# Patient Record
Sex: FEMALE | Race: WHITE | NOT HISPANIC OR LATINO | Employment: OTHER | URBAN - METROPOLITAN AREA
[De-identification: names, ages, dates, MRNs, and addresses within clinical notes are randomized per-mention and may not be internally consistent; named-entity substitution may affect disease eponyms.]

---

## 2023-01-25 PROBLEM — L03.019 PARONYCHIA, FINGER: Status: ACTIVE | Noted: 2023-01-25

## 2023-01-25 PROBLEM — E11.9 TYPE 2 DIABETES MELLITUS (MULTI): Status: ACTIVE | Noted: 2023-01-25

## 2023-01-25 PROBLEM — H25.042 POSTERIOR SUBCAPSULAR POLAR AGE-RELATED CATARACT OF LEFT EYE: Status: ACTIVE | Noted: 2023-01-25

## 2023-01-25 PROBLEM — R19.5 LOOSE STOOLS: Status: ACTIVE | Noted: 2023-01-25

## 2023-01-25 PROBLEM — E66.811 CLASS 1 OBESITY WITH SERIOUS COMORBIDITY AND BODY MASS INDEX (BMI) OF 30.0 TO 30.9 IN ADULT: Status: ACTIVE | Noted: 2023-01-25

## 2023-01-25 PROBLEM — G89.29 CHRONIC RIGHT-SIDED LOW BACK PAIN WITH RIGHT-SIDED SCIATICA: Status: ACTIVE | Noted: 2023-01-25

## 2023-01-25 PROBLEM — N32.81 OAB (OVERACTIVE BLADDER): Status: ACTIVE | Noted: 2023-01-25

## 2023-01-25 PROBLEM — L25.9 CONTACT DERMATITIS: Status: ACTIVE | Noted: 2023-01-25

## 2023-01-25 PROBLEM — S69.90XA INJURY OF RING FINGER: Status: ACTIVE | Noted: 2023-01-25

## 2023-01-25 PROBLEM — H04.201 EPIPHORA OF RIGHT SIDE: Status: ACTIVE | Noted: 2023-01-25

## 2023-01-25 PROBLEM — R07.9 CHEST PAIN: Status: ACTIVE | Noted: 2023-01-25

## 2023-01-25 PROBLEM — M19.042 ARTHRITIS OF HAND, LEFT, DEGENERATIVE: Status: ACTIVE | Noted: 2023-01-25

## 2023-01-25 PROBLEM — M65.9 FLEXOR TENOSYNOVITIS OF FINGER: Status: ACTIVE | Noted: 2023-01-25

## 2023-01-25 PROBLEM — Z95.1 S/P CABG X 4: Status: ACTIVE | Noted: 2023-01-25

## 2023-01-25 PROBLEM — R53.83 FATIGUE: Status: ACTIVE | Noted: 2023-01-25

## 2023-01-25 PROBLEM — G89.29 CHRONIC LOW BACK PAIN: Status: ACTIVE | Noted: 2023-01-25

## 2023-01-25 PROBLEM — H04.203 TEARING EYES: Status: ACTIVE | Noted: 2023-01-25

## 2023-01-25 PROBLEM — N13.5 URETERAL STRICTURE, RIGHT: Status: ACTIVE | Noted: 2023-01-25

## 2023-01-25 PROBLEM — L30.4 INTERTRIGO: Status: ACTIVE | Noted: 2023-01-25

## 2023-01-25 PROBLEM — M65.30 ACQUIRED TRIGGER FINGER: Status: ACTIVE | Noted: 2023-01-25

## 2023-01-25 PROBLEM — H60.62 CHRONIC NON-INFECTIVE OTITIS EXTERNA OF LEFT EAR: Status: ACTIVE | Noted: 2023-01-25

## 2023-01-25 PROBLEM — H25.012 CORTICAL AGE-RELATED CATARACT OF LEFT EYE: Status: ACTIVE | Noted: 2023-01-25

## 2023-01-25 PROBLEM — N39.0 ACUTE URINARY TRACT INFECTION: Status: ACTIVE | Noted: 2023-01-25

## 2023-01-25 PROBLEM — N13.30 HYDRONEPHROSIS OF RIGHT KIDNEY: Status: ACTIVE | Noted: 2023-01-25

## 2023-01-25 PROBLEM — E55.9 VITAMIN D DEFICIENCY: Status: ACTIVE | Noted: 2023-01-25

## 2023-01-25 PROBLEM — K64.9 HEMORRHOIDS: Status: ACTIVE | Noted: 2023-01-25

## 2023-01-25 PROBLEM — M65.949 FLEXOR TENOSYNOVITIS OF FINGER: Status: ACTIVE | Noted: 2023-01-25

## 2023-01-25 PROBLEM — K21.9 CHRONIC GERD: Status: ACTIVE | Noted: 2023-01-25

## 2023-01-25 PROBLEM — J31.0 INFLAMED NASAL MUCOSA: Status: ACTIVE | Noted: 2023-01-25

## 2023-01-25 PROBLEM — L91.8 CUTANEOUS SKIN TAGS: Status: ACTIVE | Noted: 2023-01-25

## 2023-01-25 PROBLEM — M85.80 OSTEOPENIA: Status: ACTIVE | Noted: 2023-01-25

## 2023-01-25 PROBLEM — N20.0 NEPHROLITHIASIS: Status: ACTIVE | Noted: 2023-01-25

## 2023-01-25 PROBLEM — R60.9 EDEMA: Status: ACTIVE | Noted: 2023-01-25

## 2023-01-25 PROBLEM — F32.0 CURRENT MILD EPISODE OF MAJOR DEPRESSIVE DISORDER WITHOUT PRIOR EPISODE (CMS-HCC): Status: ACTIVE | Noted: 2023-01-25

## 2023-01-25 PROBLEM — R19.5 DARK STOOLS: Status: ACTIVE | Noted: 2023-01-25

## 2023-01-25 PROBLEM — R31.9 HEMATURIA: Status: ACTIVE | Noted: 2023-01-25

## 2023-01-25 PROBLEM — L65.9 HAIR LOSS: Status: ACTIVE | Noted: 2023-01-25

## 2023-01-25 PROBLEM — H25.11 AGE-RELATED NUCLEAR CATARACT OF RIGHT EYE: Status: ACTIVE | Noted: 2023-01-25

## 2023-01-25 PROBLEM — F32.A CHRONIC DEPRESSION: Status: ACTIVE | Noted: 2023-01-25

## 2023-01-25 PROBLEM — N20.1 RIGHT URETERAL STONE: Status: ACTIVE | Noted: 2023-01-25

## 2023-01-25 PROBLEM — R60.0 HAND EDEMA: Status: ACTIVE | Noted: 2023-01-25

## 2023-01-25 PROBLEM — R80.9 DIABETES MELLITUS DUE TO UNDERLYING CONDITION WITH MICROALBUMINURIA, WITHOUT LONG-TERM CURRENT USE OF INSULIN (MULTI): Status: ACTIVE | Noted: 2023-01-25

## 2023-01-25 PROBLEM — J10.1 INFLUENZA A: Status: ACTIVE | Noted: 2023-01-25

## 2023-01-25 PROBLEM — L20.9 AD (ATOPIC DERMATITIS): Status: ACTIVE | Noted: 2023-01-25

## 2023-01-25 PROBLEM — N89.8 VAGINAL ITCHING: Status: ACTIVE | Noted: 2023-01-25

## 2023-01-25 PROBLEM — N28.9 RENAL INSUFFICIENCY: Status: ACTIVE | Noted: 2023-01-25

## 2023-01-25 PROBLEM — E78.5 HYPERLIPIDEMIA: Status: ACTIVE | Noted: 2023-01-25

## 2023-01-25 PROBLEM — H26.102 TRAUMATIC CATARACT OF LEFT EYE: Status: ACTIVE | Noted: 2023-01-25

## 2023-01-25 PROBLEM — E53.8 VITAMIN B12 DEFICIENCY: Status: ACTIVE | Noted: 2023-01-25

## 2023-01-25 PROBLEM — E66.9 CLASS 1 OBESITY WITH SERIOUS COMORBIDITY AND BODY MASS INDEX (BMI) OF 30.0 TO 30.9 IN ADULT: Status: ACTIVE | Noted: 2023-01-25

## 2023-01-25 PROBLEM — M54.41 CHRONIC RIGHT-SIDED LOW BACK PAIN WITH RIGHT-SIDED SCIATICA: Status: ACTIVE | Noted: 2023-01-25

## 2023-01-25 PROBLEM — M79.609 LIMB PAIN: Status: ACTIVE | Noted: 2023-01-25

## 2023-01-25 PROBLEM — R39.15 URINARY URGENCY: Status: ACTIVE | Noted: 2023-01-25

## 2023-01-25 PROBLEM — L29.9 ITCHING OF EAR: Status: ACTIVE | Noted: 2023-01-25

## 2023-01-25 PROBLEM — I25.10 CAD (CORONARY ARTERY DISEASE): Status: ACTIVE | Noted: 2023-01-25

## 2023-01-25 PROBLEM — M54.50 CHRONIC LOW BACK PAIN: Status: ACTIVE | Noted: 2023-01-25

## 2023-01-25 PROBLEM — H25.011 CORTICAL AGE-RELATED CATARACT OF RIGHT EYE: Status: ACTIVE | Noted: 2023-01-25

## 2023-01-25 PROBLEM — M79.641 PAIN OF RIGHT HAND: Status: ACTIVE | Noted: 2023-01-25

## 2023-01-25 PROBLEM — N18.30 CKD (CHRONIC KIDNEY DISEASE) STAGE 3, GFR 30-59 ML/MIN (MULTI): Status: ACTIVE | Noted: 2023-01-25

## 2023-01-25 PROBLEM — R32 URINARY INCONTINENCE: Status: ACTIVE | Noted: 2023-01-25

## 2023-01-25 PROBLEM — M19.041 ARTHRITIS OF HAND, RIGHT, DEGENERATIVE: Status: ACTIVE | Noted: 2023-01-25

## 2023-01-25 PROBLEM — F43.21 GRIEF: Status: ACTIVE | Noted: 2023-01-25
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
## 2023-01-25 PROBLEM — E08.29 DIABETES MELLITUS DUE TO UNDERLYING CONDITION WITH MICROALBUMINURIA, WITHOUT LONG-TERM CURRENT USE OF INSULIN (MULTI): Status: ACTIVE | Noted: 2023-01-25

## 2023-01-25 PROBLEM — J30.9 ALLERGIC RHINITIS: Status: ACTIVE | Noted: 2023-01-25

## 2023-01-25 PROBLEM — H25.12 AGE-RELATED NUCLEAR CATARACT OF LEFT EYE: Status: ACTIVE | Noted: 2023-01-25

## 2023-01-25 PROBLEM — N18.9 CHRONIC RENAL INSUFFICIENCY: Status: ACTIVE | Noted: 2023-01-25

## 2023-01-25 RX ORDER — SIMVASTATIN 20 MG/1
1 TABLET, FILM COATED ORAL NIGHTLY
COMMUNITY
Start: 2016-12-20 | End: 2024-02-07 | Stop reason: SDUPTHER

## 2023-01-25 RX ORDER — LANCETS 28 GAUGE
EACH MISCELLANEOUS
COMMUNITY

## 2023-01-25 RX ORDER — CHOLECALCIFEROL (VITAMIN D3) 25 MCG
1 TABLET ORAL DAILY
COMMUNITY
Start: 2016-11-15

## 2023-01-25 RX ORDER — METOPROLOL TARTRATE 50 MG/1
1 TABLET ORAL 2 TIMES DAILY
COMMUNITY
Start: 2016-11-15 | End: 2024-02-07 | Stop reason: SDUPTHER

## 2023-01-25 RX ORDER — GLIMEPIRIDE 2 MG/1
2 TABLET ORAL 2 TIMES DAILY
COMMUNITY

## 2023-01-25 RX ORDER — ELECTROLYTES/DEXTROSE
1 SOLUTION, ORAL ORAL DAILY
COMMUNITY
Start: 2017-08-11

## 2023-01-25 RX ORDER — BLOOD SUGAR DIAGNOSTIC
STRIP MISCELLANEOUS
COMMUNITY
Start: 2021-01-11

## 2023-01-25 RX ORDER — METFORMIN HYDROCHLORIDE 500 MG/1
500 TABLET ORAL
COMMUNITY
Start: 2014-02-10

## 2023-01-25 RX ORDER — ASPIRIN 81 MG/1
1 TABLET ORAL DAILY
COMMUNITY
Start: 2021-12-15

## 2023-03-08 ENCOUNTER — APPOINTMENT (OUTPATIENT)
Dept: PRIMARY CARE | Facility: CLINIC | Age: 88
End: 2023-03-08
Payer: MEDICARE

## 2023-03-13 LAB
ALBUMIN (MG/L) IN URINE: 32.1 MG/L
ALBUMIN/CREATININE (UG/MG) IN URINE: 32.9 UG/MG CRT (ref 0–30)
ANION GAP IN SER/PLAS: 14 MMOL/L (ref 10–20)
CALCIUM (MG/DL) IN SER/PLAS: 9.8 MG/DL (ref 8.6–10.3)
CARBON DIOXIDE, TOTAL (MMOL/L) IN SER/PLAS: 26 MMOL/L (ref 21–32)
CHLORIDE (MMOL/L) IN SER/PLAS: 103 MMOL/L (ref 98–107)
CREATININE (MG/DL) IN SER/PLAS: 1.27 MG/DL (ref 0.5–1.05)
CREATININE (MG/DL) IN URINE: 97.5 MG/DL (ref 20–320)
ESTIMATED AVERAGE GLUCOSE FOR HBA1C: 200 MG/DL
GFR FEMALE: 41 ML/MIN/1.73M2
GLUCOSE (MG/DL) IN SER/PLAS: 168 MG/DL (ref 74–99)
HEMOGLOBIN A1C/HEMOGLOBIN TOTAL IN BLOOD: 8.6 %
POTASSIUM (MMOL/L) IN SER/PLAS: 4.3 MMOL/L (ref 3.5–5.3)
SODIUM (MMOL/L) IN SER/PLAS: 139 MMOL/L (ref 136–145)
UREA NITROGEN (MG/DL) IN SER/PLAS: 17 MG/DL (ref 6–23)

## 2023-03-28 ENCOUNTER — OFFICE VISIT (OUTPATIENT)
Dept: PRIMARY CARE | Facility: CLINIC | Age: 88
End: 2023-03-28
Payer: MEDICARE

## 2023-03-28 VITALS
TEMPERATURE: 96.8 F | HEIGHT: 61 IN | OXYGEN SATURATION: 98 % | HEART RATE: 76 BPM | DIASTOLIC BLOOD PRESSURE: 80 MMHG | SYSTOLIC BLOOD PRESSURE: 138 MMHG | BODY MASS INDEX: 29.11 KG/M2 | WEIGHT: 154.2 LBS

## 2023-03-28 DIAGNOSIS — R80.9 DIABETES MELLITUS DUE TO UNDERLYING CONDITION WITH MICROALBUMINURIA, WITHOUT LONG-TERM CURRENT USE OF INSULIN (MULTI): ICD-10-CM

## 2023-03-28 DIAGNOSIS — I25.10 CORONARY ARTERY DISEASE INVOLVING NATIVE CORONARY ARTERY OF NATIVE HEART WITHOUT ANGINA PECTORIS: ICD-10-CM

## 2023-03-28 DIAGNOSIS — Z00.00 MEDICARE ANNUAL WELLNESS VISIT, SUBSEQUENT: Primary | ICD-10-CM

## 2023-03-28 DIAGNOSIS — E08.29 DIABETES MELLITUS DUE TO UNDERLYING CONDITION WITH MICROALBUMINURIA, WITHOUT LONG-TERM CURRENT USE OF INSULIN (MULTI): ICD-10-CM

## 2023-03-28 DIAGNOSIS — E78.2 MIXED HYPERLIPIDEMIA: ICD-10-CM

## 2023-03-28 PROCEDURE — 1170F FXNL STATUS ASSESSED: CPT | Performed by: INTERNAL MEDICINE

## 2023-03-28 PROCEDURE — 99213 OFFICE O/P EST LOW 20 MIN: CPT | Performed by: INTERNAL MEDICINE

## 2023-03-28 PROCEDURE — 1159F MED LIST DOCD IN RCRD: CPT | Performed by: INTERNAL MEDICINE

## 2023-03-28 PROCEDURE — G0439 PPPS, SUBSEQ VISIT: HCPCS | Performed by: INTERNAL MEDICINE

## 2023-03-28 ASSESSMENT — ACTIVITIES OF DAILY LIVING (ADL)
MANAGING_FINANCES: INDEPENDENT
TAKING_MEDICATION: INDEPENDENT
GROCERY_SHOPPING: INDEPENDENT
DOING_HOUSEWORK: INDEPENDENT
BATHING: INDEPENDENT
DRESSING: INDEPENDENT

## 2023-03-28 ASSESSMENT — ENCOUNTER SYMPTOMS
MUSCULOSKELETAL NEGATIVE: 1
ENDOCRINE NEGATIVE: 1
ALLERGIC/IMMUNOLOGIC NEGATIVE: 1
HEMATOLOGIC/LYMPHATIC NEGATIVE: 1
CARDIOVASCULAR NEGATIVE: 1
GASTROINTESTINAL NEGATIVE: 1
EYES NEGATIVE: 1
RESPIRATORY NEGATIVE: 1
CONSTITUTIONAL NEGATIVE: 1
PSYCHIATRIC NEGATIVE: 1
DEPRESSION: 0
OCCASIONAL FEELINGS OF UNSTEADINESS: 0
LOSS OF SENSATION IN FEET: 0
NEUROLOGICAL NEGATIVE: 1

## 2023-03-28 NOTE — PROGRESS NOTES
"Subjective   Patient ID: Lisa Thakkar is a 88 y.o. female who presents for 6 month follow up and Medicare Annual Wellness Visit Subsequent.  Lipids have been well controlled on current medication regimen.  Denies myalgias or other side effects of medication.  This is managed by Cardiology.    Patient presents in follow up re:  Type 2 diabetes.  Patient has been compliant with medical therapy as prescribed and mostly compliant with diet and exercise recommendations.  HgbA1c has been maintained at goal level.  No hypoglycemia reported and no other associated complaints.  This is managed by Endo.          Review of Systems   Constitutional: Negative.    HENT: Negative.     Eyes: Negative.    Respiratory: Negative.     Cardiovascular: Negative.    Gastrointestinal: Negative.    Endocrine: Negative.    Genitourinary: Negative.    Musculoskeletal: Negative.    Skin: Negative.    Allergic/Immunologic: Negative.    Neurological: Negative.    Hematological: Negative.    Psychiatric/Behavioral: Negative.         Objective     Blood pressure 160/62, pulse 76, temperature 36 °C (96.8 °F), temperature source Temporal, height 1.549 m (5' 1\"), weight 69.9 kg (154 lb 3.2 oz), SpO2 98 %.   Physical Exam  Constitutional:       Appearance: Normal appearance.   Neck:      Vascular: No carotid bruit.   Cardiovascular:      Rate and Rhythm: Normal rate and regular rhythm.      Pulses: Normal pulses.      Heart sounds: Normal heart sounds. No murmur heard.  Pulmonary:      Effort: Pulmonary effort is normal.      Breath sounds: Normal breath sounds. No wheezing or rales.   Abdominal:      General: Abdomen is flat. There is no distension.      Palpations: Abdomen is soft.      Tenderness: There is no abdominal tenderness.   Musculoskeletal:         General: Normal range of motion.      Cervical back: Normal range of motion and neck supple.   Skin:     General: Skin is warm and dry.   Neurological:      General: No focal deficit present.    "   Mental Status: She is alert and oriented to person, place, and time.   Psychiatric:         Mood and Affect: Mood normal.         Behavior: Behavior normal.         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  Patient does not have advanced care planning in place.  This is discussed and form is given today  Sugars well controlled overall with A1c at goal.  Will continue present medical therapy as per Endo.  LDL at goal on medication per Cardiology.  No adverse effects from medication.  Advised to continue present therapy.  BP well controlled on current therapy per Cardiology.  Advised to continue present therapy.    Since all current medical care is per the subspecialists, Patient is asked to follow up annually or prn

## 2023-04-10 ENCOUNTER — OFFICE VISIT (OUTPATIENT)
Dept: PRIMARY CARE | Facility: CLINIC | Age: 88
End: 2023-04-10
Payer: MEDICARE

## 2023-04-10 ENCOUNTER — APPOINTMENT (OUTPATIENT)
Dept: PRIMARY CARE | Facility: CLINIC | Age: 88
End: 2023-04-10
Payer: MEDICARE

## 2023-04-10 VITALS
SYSTOLIC BLOOD PRESSURE: 140 MMHG | DIASTOLIC BLOOD PRESSURE: 70 MMHG | OXYGEN SATURATION: 93 % | TEMPERATURE: 97 F | HEART RATE: 81 BPM | WEIGHT: 155.4 LBS | BODY MASS INDEX: 29.36 KG/M2
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
## 2023-04-10 DIAGNOSIS — S01.01XD LACERATION OF OCCIPITAL REGION OF SCALP, SUBSEQUENT ENCOUNTER: ICD-10-CM

## 2023-04-10 PROCEDURE — 99213 OFFICE O/P EST LOW 20 MIN: CPT | Performed by: INTERNAL MEDICINE

## 2023-04-10 PROCEDURE — 1159F MED LIST DOCD IN RCRD: CPT | Performed by: INTERNAL MEDICINE

## 2023-04-10 ASSESSMENT — ENCOUNTER SYMPTOMS
HEMATOLOGIC/LYMPHATIC NEGATIVE: 1
ENDOCRINE NEGATIVE: 1
NEUROLOGICAL NEGATIVE: 1
EYES NEGATIVE: 1
CONSTITUTIONAL NEGATIVE: 1
PSYCHIATRIC NEGATIVE: 1
RESPIRATORY NEGATIVE: 1
ALLERGIC/IMMUNOLOGIC NEGATIVE: 1
CARDIOVASCULAR NEGATIVE: 1
GASTROINTESTINAL NEGATIVE: 1
MUSCULOSKELETAL NEGATIVE: 1

## 2023-04-10 NOTE — PROGRESS NOTES
Subjective   Patient ID: Lisa Thakkar is a 88 y.o. female who presents for follow up after fall .  Patient presents today with a complaint of a recent fall with laceration requiring staple placement for closure on the occipital region.    The specifics of the fall are that she was in her garage and took 2 steps backwards causing her to trip on a step and fall backwards.  No dizziness or LOC.  ER record is reviewed in detail confirming the above as well as all testing documenting no other serious injuries or intracranial abnormalities.        Review of Systems   Constitutional: Negative.    HENT: Negative.     Eyes: Negative.    Respiratory: Negative.     Cardiovascular: Negative.    Gastrointestinal: Negative.    Endocrine: Negative.    Genitourinary: Negative.    Musculoskeletal: Negative.    Skin: Negative.    Allergic/Immunologic: Negative.    Neurological: Negative.    Hematological: Negative.    Psychiatric/Behavioral: Negative.         Objective     Blood pressure 140/70, pulse 81, temperature 36.1 °C (97 °F), temperature source Temporal, weight 70.5 kg (155 lb 6.4 oz), SpO2 93 %.   Physical Exam  Constitutional:       Appearance: Normal appearance.   Cardiovascular:      Rate and Rhythm: Normal rate and regular rhythm.   Pulmonary:      Effort: Pulmonary effort is normal.      Breath sounds: Normal breath sounds.   Musculoskeletal:      Cervical back: Normal range of motion and neck supple.   Skin:     Comments: Well healed laceration on the occiput with 3 staples visualized for closure.   Neurological:      General: No focal deficit present.      Mental Status: She is alert and oriented to person, place, and time.   Psychiatric:         Mood and Affect: Mood normal.         Behavior: Behavior normal.         Assessment/Plan   Problem List Items Addressed This Visit    None  Visit Diagnoses       Fall in home, subsequent encounter    -  Primary    Laceration of occipital region of scalp, subsequent encounter               Will remove sutures today.  No other issues identified.  She receives her other medical care through Endo and Cardiology.    Will plan routine medical follow up as needed in 6 months.

## 2023-04-27 ENCOUNTER — PATIENT OUTREACH (OUTPATIENT)
Dept: PRIMARY CARE | Facility: CLINIC | Age: 88
End: 2023-04-27
Payer: MEDICARE

## 2023-04-27 ENCOUNTER — DOCUMENTATION (OUTPATIENT)
Dept: PRIMARY CARE | Facility: CLINIC | Age: 88
End: 2023-04-27
Payer: MEDICARE

## 2023-04-27 RX ORDER — CEPHALEXIN 500 MG/1
500 CAPSULE ORAL 2 TIMES DAILY
COMMUNITY
Start: 2023-04-26 | End: 2024-02-07 | Stop reason: ALTCHOICE

## 2023-04-27 NOTE — PROGRESS NOTES
Discharge Facility: [facility]  Discharge Diagnosis: Ureteral obstruction  Admission Date: 4/24/23  Discharge Date: 4/26/23    PCP Appointment Date: Declined to schedule  Specialist Appointment Date: Urology 5/9/23  Hospital Encounter and Summary: Linked   See discharge assessment below for further details   Patient states she has taken Cephalexin in the past for UTI with no adverse effects. Patient has already started the medication and reports no adverse reaction at this time.  Engagement  Call Start Time: 1400 (4/27/2023  2:04 PM)    Medications  Medications reviewed with patient/caregiver?: Yes (4/27/2023  2:04 PM)  Is the patient having any side effects they believe may be caused by any medication additions or changes?: No (4/27/2023  2:04 PM)  Does the patient have all medications ordered at discharge?: Yes (4/27/2023  2:04 PM)  Care Management Interventions: No intervention needed (4/27/2023  2:04 PM)  Is the patient taking all medications as directed (includes completed medication regime)?: Yes (4/27/2023  2:04 PM)  Care Management Interventions: Provided patient education (4/27/2023  2:04 PM)    Appointments  Does the patient have a primary care provider?: Yes (4/27/2023  2:04 PM)  Care Management Interventions: Advised patient to make appointment (4/27/2023  2:04 PM)  Has the patient kept scheduled appointments due by today?: Not applicable (4/27/2023  2:04 PM)    Self Management  What is the home health agency?: [facility] (4/27/2023  2:04 PM)  Has home health visited the patient within 72 hours of discharge?: Call prior to 72 hours (4/27/2023  2:04 PM)  What Durable Medical Equipment (DME) was ordered?: n/a (4/27/2023  2:04 PM)    Patient Teaching  Does the patient have access to their discharge instructions?: Yes (4/27/2023  2:04 PM)  Care Management Interventions: Reviewed instructions with patient (4/27/2023  2:04 PM)  What is the patient's perception of their health status since discharge?: Same (4/27/2023  2:04  PM)  Is the patient/caregiver able to teach back the hierarchy of who to call/visit for symptoms/problems? PCP, Specialist, Home Health nurse, Urgent Care, ED, 911: Yes (4/27/2023  2:04 PM)    Wrap Up  Wrap Up Additional Comments: Patient states she is home and not recovering as she would like. Patient reports continued low flank pain that has not worsened, but has not improved since she was admitted. Patient has a urology appt scheduled for 5/9. Patients states home care already called her to start care and a nurse is scheduled to come out tomorrow. Patient denied any new symptoms. Patient has all medications needed in the home- reports taking the antibiotic as prescribed. Educated on possible GI side effects such as diarrhea and to report side effects to her PCP promptly. Patient denied need for DME stating she already has everything she needs. Patient states she is waiting to schedule a PCP appointment until home care comes out to see her. Patient denied any further questions or concerns. Patient was encouraged to call back with any questions or concerns with phone number provided. Encouraged patient to continue checking her blood sugars and documenting. Educated on need to eat protein and drink plenty of water. Patient verbalized understanding of education. (4/27/2023  2:04 PM)  Call End Time: 1412 (4/27/2023  2:04 PM)

## 2023-05-01 ENCOUNTER — TELEPHONE (OUTPATIENT)
Dept: PRIMARY CARE | Facility: CLINIC | Age: 88
End: 2023-05-01
Payer: MEDICARE

## 2023-05-01 NOTE — TELEPHONE ENCOUNTER
[facility] the patient is experiencing significant back pain from a fall back in March. The patient stated that she is having trouble sleeping in different positions because of the pain.     The patient is reporting that her pain level is a 9/10.     The patient did go to the ER on 04/24/2023 and Nani wanted to know did you get the x-ray reults from the ER yet?

## 2023-05-02 NOTE — TELEPHONE ENCOUNTER
Nani, an OT from  Home care called in to report that this patient's BP was elevated at 164/85 and the patient sis take all meds as prescribed.

## 2023-05-03 LAB
ANION GAP IN SER/PLAS: 16 MMOL/L (ref 10–20)
CALCIUM (MG/DL) IN SER/PLAS: 9.9 MG/DL (ref 8.6–10.6)
CARBON DIOXIDE, TOTAL (MMOL/L) IN SER/PLAS: 26 MMOL/L (ref 21–32)
CHLORIDE (MMOL/L) IN SER/PLAS: 101 MMOL/L (ref 98–107)
CREATININE (MG/DL) IN SER/PLAS: 1.21 MG/DL (ref 0.5–1.05)
GFR FEMALE: 43 ML/MIN/1.73M2
GLUCOSE (MG/DL) IN SER/PLAS: 238 MG/DL (ref 74–99)
POTASSIUM (MMOL/L) IN SER/PLAS: 5.2 MMOL/L (ref 3.5–5.3)
SODIUM (MMOL/L) IN SER/PLAS: 138 MMOL/L (ref 136–145)
UREA NITROGEN (MG/DL) IN SER/PLAS: 19 MG/DL (ref 6–23)

## 2023-05-12 ENCOUNTER — PATIENT OUTREACH (OUTPATIENT)
Dept: PRIMARY CARE | Facility: CLINIC | Age: 88
End: 2023-05-12
Payer: MEDICARE
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
## 2023-05-12 NOTE — PROGRESS NOTES
Unable to reach patient for call back after patient's follow up appointment with Urology. Patient declined to follow up with her PCP and has no upcoming appts scheduled.  M with call back number for patient to call if needed   If no voicemail available call attempts x 2 were made to contact the patient to assist with any questions or concerns patient may have.

## 2023-05-26 ENCOUNTER — PATIENT OUTREACH (OUTPATIENT)
Dept: PRIMARY CARE | Facility: CLINIC | Age: 88
End: 2023-05-26
Payer: MEDICARE
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
## 2023-05-26 NOTE — PROGRESS NOTES
Call placed regarding one month post discharge follow up call.  At time of outreach call the patient feels as if their condition has returned to baseline since initial visit with PCP or specialist.  Questions or concerns regarding recovery period addressed at this time.   Reviewed any PCP or specialists progress notes/labs/radiology reports if applicable and addressed any questions or concerns. Patient followed up with Urology and declined to schedule a follow up with her PCP post hospitalization. Patient states she is doing much better and has no further needs or questions at this time. I encouraged her to call her PCP promptly with any changes in status to prevent another hospitalization. Patient verbalized her understanding.

## 2023-07-26 ENCOUNTER — PATIENT OUTREACH (OUTPATIENT)
Dept: PRIMARY CARE | Facility: CLINIC | Age: 88
End: 2023-07-26
Payer: MEDICARE
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
## 2023-07-26 NOTE — PROGRESS NOTES
Unsuccessful outreach to this patient for the 90 day post discharge outreach. Left a voice-mail message including my direct call back number for the patient to call regarding any questions or concerns.  Patient has met target of no readmissions for 90 days and has graduated from [facility]

## 2023-09-15 LAB
ALBUMIN (MG/L) IN URINE: 30.5 MG/L
ALBUMIN/CREATININE (UG/MG) IN URINE: 35.5 UG/MG CRT (ref 0–30)
ANION GAP IN SER/PLAS: 15 MMOL/L (ref 10–20)
CALCIUM (MG/DL) IN SER/PLAS: 9.9 MG/DL (ref 8.6–10.6)
CARBON DIOXIDE, TOTAL (MMOL/L) IN SER/PLAS: 27 MMOL/L (ref 21–32)
CHLORIDE (MMOL/L) IN SER/PLAS: 106 MMOL/L (ref 98–107)
CHOLESTEROL (MG/DL) IN SER/PLAS: 127 MG/DL (ref 0–199)
CHOLESTEROL IN HDL (MG/DL) IN SER/PLAS: 50.3 MG/DL
CHOLESTEROL/HDL RATIO: 2.5
CREATININE (MG/DL) IN SER/PLAS: 1.25 MG/DL (ref 0.5–1.05)
CREATININE (MG/DL) IN URINE: 86 MG/DL (ref 20–320)
ESTIMATED AVERAGE GLUCOSE FOR HBA1C: 212 MG/DL
GFR FEMALE: 42 ML/MIN/1.73M2
GLUCOSE (MG/DL) IN SER/PLAS: 168 MG/DL (ref 74–99)
HEMOGLOBIN A1C/HEMOGLOBIN TOTAL IN BLOOD: 9 %
LDL: 38 MG/DL (ref 0–99)
POTASSIUM (MMOL/L) IN SER/PLAS: 4.3 MMOL/L (ref 3.5–5.3)
SODIUM (MMOL/L) IN SER/PLAS: 144 MMOL/L (ref 136–145)
TRIGLYCERIDE (MG/DL) IN SER/PLAS: 195 MG/DL (ref 0–149)
UREA NITROGEN (MG/DL) IN SER/PLAS: 15 MG/DL (ref 6–23)
VLDL: 39 MG/DL (ref 0–40)

## 2023-10-05 ENCOUNTER — LAB (OUTPATIENT)
Dept: LAB | Facility: LAB | Age: 88
End: 2023-10-05
Payer: MEDICARE

## 2023-10-05 ENCOUNTER — OFFICE VISIT (OUTPATIENT)
Dept: PRIMARY CARE | Facility: CLINIC | Age: 88
End: 2023-10-05
Payer: MEDICARE

## 2023-10-05 VITALS
WEIGHT: 159 LBS | DIASTOLIC BLOOD PRESSURE: 66 MMHG | TEMPERATURE: 97.2 F | HEIGHT: 61 IN | HEART RATE: 87 BPM | SYSTOLIC BLOOD PRESSURE: 139 MMHG | OXYGEN SATURATION: 95 % | BODY MASS INDEX: 30.02 KG/M2
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
## 2023-10-05 DIAGNOSIS — R53.83 OTHER FATIGUE: ICD-10-CM
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
## 2023-10-05 DIAGNOSIS — H61.22 IMPACTED CERUMEN OF LEFT EAR: ICD-10-CM

## 2023-10-05 DIAGNOSIS — Z23 NEED FOR IMMUNIZATION AGAINST INFLUENZA: ICD-10-CM

## 2023-10-05 DIAGNOSIS — R80.9 DIABETES MELLITUS DUE TO UNDERLYING CONDITION WITH MICROALBUMINURIA, WITHOUT LONG-TERM CURRENT USE OF INSULIN (MULTI): ICD-10-CM

## 2023-10-05 DIAGNOSIS — M54.50 ACUTE RIGHT-SIDED LOW BACK PAIN WITHOUT SCIATICA: ICD-10-CM

## 2023-10-05 DIAGNOSIS — H91.92 DECREASED HEARING OF LEFT EAR: ICD-10-CM

## 2023-10-05 DIAGNOSIS — E08.29 DIABETES MELLITUS DUE TO UNDERLYING CONDITION WITH MICROALBUMINURIA, WITHOUT LONG-TERM CURRENT USE OF INSULIN (MULTI): ICD-10-CM

## 2023-10-05 LAB
BASOPHILS # BLD AUTO: 0.03 X10*3/UL (ref 0–0.1)
BASOPHILS NFR BLD AUTO: 0.5 %
EOSINOPHIL # BLD AUTO: 0.11 X10*3/UL (ref 0–0.4)
EOSINOPHIL NFR BLD AUTO: 1.7 %
ERYTHROCYTE [DISTWIDTH] IN BLOOD BY AUTOMATED COUNT: 14.9 % (ref 11.5–14.5)
HCT VFR BLD AUTO: 38.2 % (ref 36–46)
HGB BLD-MCNC: 12.7 G/DL (ref 12–16)
IMM GRANULOCYTES # BLD AUTO: 0.03 X10*3/UL (ref 0–0.5)
IMM GRANULOCYTES NFR BLD AUTO: 0.5 % (ref 0–0.9)
LYMPHOCYTES # BLD AUTO: 1.28 X10*3/UL (ref 0.8–3)
LYMPHOCYTES NFR BLD AUTO: 19.7 %
MCH RBC QN AUTO: 28.7 PG (ref 26–34)
MCHC RBC AUTO-ENTMCNC: 33.2 G/DL (ref 32–36)
MCV RBC AUTO: 86 FL (ref 80–100)
MONOCYTES # BLD AUTO: 0.57 X10*3/UL (ref 0.05–0.8)
MONOCYTES NFR BLD AUTO: 8.8 %
NEUTROPHILS # BLD AUTO: 4.47 X10*3/UL (ref 1.6–5.5)
NEUTROPHILS NFR BLD AUTO: 68.8 %
NRBC BLD-RTO: 0 /100 WBCS (ref 0–0)
PLATELET # BLD AUTO: 285 X10*3/UL (ref 150–450)
PMV BLD AUTO: 11 FL (ref 7.5–11.5)
RBC # BLD AUTO: 4.42 X10*6/UL (ref 4–5.2)
VIT B12 SERPL-MCNC: 250 PG/ML (ref 211–911)
WBC # BLD AUTO: 6.5 X10*3/UL (ref 4.4–11.3)

## 2023-10-05 PROCEDURE — 36415 COLL VENOUS BLD VENIPUNCTURE: CPT

## 2023-10-05 PROCEDURE — G0008 ADMIN INFLUENZA VIRUS VAC: HCPCS | Performed by: INTERNAL MEDICINE

## 2023-10-05 PROCEDURE — 1159F MED LIST DOCD IN RCRD: CPT | Performed by: INTERNAL MEDICINE

## 2023-10-05 PROCEDURE — 1126F AMNT PAIN NOTED NONE PRSNT: CPT | Performed by: INTERNAL MEDICINE

## 2023-10-05 PROCEDURE — 1036F TOBACCO NON-USER: CPT | Performed by: INTERNAL MEDICINE

## 2023-10-05 PROCEDURE — 85025 COMPLETE CBC W/AUTO DIFF WBC: CPT

## 2023-10-05 PROCEDURE — 99214 OFFICE O/P EST MOD 30 MIN: CPT | Performed by: INTERNAL MEDICINE

## 2023-10-05 PROCEDURE — 82607 VITAMIN B-12: CPT

## 2023-10-05 PROCEDURE — 90662 IIV NO PRSV INCREASED AG IM: CPT | Performed by: INTERNAL MEDICINE

## 2023-10-05 ASSESSMENT — ENCOUNTER SYMPTOMS
CONSTITUTIONAL NEGATIVE: 1
OCCASIONAL FEELINGS OF UNSTEADINESS: 0
BACK PAIN: 1
DEPRESSION: 0
HEMATOLOGIC/LYMPHATIC NEGATIVE: 1
GASTROINTESTINAL NEGATIVE: 1
EYES NEGATIVE: 1
PSYCHIATRIC NEGATIVE: 1
ALLERGIC/IMMUNOLOGIC NEGATIVE: 1
RESPIRATORY NEGATIVE: 1
CARDIOVASCULAR NEGATIVE: 1
ENDOCRINE NEGATIVE: 1
LOSS OF SENSATION IN FEET: 0
NEUROLOGICAL NEGATIVE: 1

## 2023-10-05 NOTE — PROGRESS NOTES
"Subjective   Patient ID: Lisa Thakkar is a 88 y.o. female who presents for 6 month frollow up .  Patient presents today for brief check after having suffered a fall in the spring with closed head trauma.  She continues to otherwise follow with subspecialists for all of her chronic medical care and therapy.  She has had no sequelae of the fall and mental status has been at its usual baseline.    She has new complaint today of back pain.  She states that she got a new mattress and it is high from the floor and she thinks she sprained her back trying to get into the bed.  She states that she has thus been sleeping on her couch.  No radicular Sx.        Review of Systems   Constitutional: Negative.    HENT: Negative.     Eyes: Negative.    Respiratory: Negative.     Cardiovascular: Negative.    Gastrointestinal: Negative.    Endocrine: Negative.    Genitourinary: Negative.    Musculoskeletal:  Positive for back pain.   Skin: Negative.    Allergic/Immunologic: Negative.    Neurological: Negative.    Hematological: Negative.    Psychiatric/Behavioral: Negative.         Objective     Blood pressure 139/66, pulse 87, temperature 36.2 °C (97.2 °F), temperature source Temporal, height 1.549 m (5' 1\"), weight 72.1 kg (159 lb), SpO2 95 %.   Physical Exam  Vitals reviewed.   Constitutional:       Appearance: Normal appearance.   Neck:      Vascular: No carotid bruit.   Cardiovascular:      Rate and Rhythm: Normal rate and regular rhythm.      Pulses: Normal pulses.      Heart sounds: Normal heart sounds. No murmur heard.  Pulmonary:      Effort: Pulmonary effort is normal.      Breath sounds: Normal breath sounds. No wheezing or rales.   Abdominal:      General: Abdomen is flat. There is no distension.      Palpations: Abdomen is soft.      Tenderness: There is no abdominal tenderness.   Musculoskeletal:         General: Normal range of motion.      Cervical back: Normal range of motion and neck supple.   Skin:     General: Skin " "is warm and dry.   Neurological:      General: No focal deficit present.      Mental Status: She is alert and oriented to person, place, and time.   Psychiatric:         Mood and Affect: Mood normal.         Behavior: Behavior normal.         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, without long-term current use of insulin (CMS/[facility])    Fatigue    Relevant Orders    CBC and Auto Differential    Vitamin B12     Other Visit Diagnoses       Fall in home, subsequent encounter    -  Primary    Relevant Orders    Flu vaccine, quadrivalent, high-dose, preservative free, age 65y+ (FLUZONE)    Acute right-sided low back pain without sciatica        Need for immunization against influenza        Relevant Orders    Flu vaccine, quadrivalent, high-dose, preservative free, age 65y+ (FLUZONE)    Decreased hearing of left ear        Impacted cerumen of left ear              She is showing no sequelae of the fall several months ago.    She has a new lumbar strain on the right side related to her bed and recently sleeping on the couch.  She has been doing stretches and is encouraged on the use of local heat as well.  DM is managed by Endo and I note recent A1c elevated at 9.0%, but no changes were made to therapy at recent Endo OV.  I note slight elevation of BP at Endo visit, but BP is excellent today.  No indication for intervention.  She complains of always being tired.  She states she used to get B12 shots remotely.  She has not been anemic to suggest B12 deficiency.  Will check CBC and B12 level for her.  Patient also c/o decreased hearing in her left ear which \"is my good ear.\"  Exam confirms cerumen impaction.  She was offered irrigation but declined as she states she is to see an ear doctor in December.  I advised that this would likely not resolve on its own, but if she wished to have the issue for another 2 months, that is certainly " her prerogative.    > 30 minutes was spent with the patient today, the majority of which was spent on review of history and medications as well as counselling on care plan and/or coordination of care.     Follow up as scheduled in March.

## 2023-12-11 ENCOUNTER — TELEPHONE (OUTPATIENT)
Dept: PRIMARY CARE | Facility: CLINIC | Age: 88
End: 2023-12-11

## 2023-12-11 NOTE — TELEPHONE ENCOUNTER
Solomon calling for a referral to Dr Wang Roberts ENT.   Her appointment is scheduled for Dec 28th

## 2023-12-12 DIAGNOSIS — H91.92 DECREASED HEARING OF LEFT EAR: Primary | ICD-10-CM

## 2023-12-12 DIAGNOSIS — H61.22 IMPACTED CERUMEN OF LEFT EAR: ICD-10-CM

## 2023-12-28 ENCOUNTER — OFFICE VISIT (OUTPATIENT)
Dept: OTOLARYNGOLOGY | Facility: CLINIC | Age: 88
End: 2023-12-28
Payer: MEDICARE

## 2023-12-28 ENCOUNTER — CLINICAL SUPPORT (OUTPATIENT)
Dept: AUDIOLOGY | Facility: CLINIC | Age: 88
End: 2023-12-28
Payer: MEDICARE

## 2023-12-28 DIAGNOSIS — H91.8X3 ASYMMETRICAL HEARING LOSS: ICD-10-CM

## 2023-12-28 DIAGNOSIS — H90.3 ASYMMETRICAL SENSORINEURAL HEARING LOSS: Primary | ICD-10-CM

## 2023-12-28 PROCEDURE — 1160F RVW MEDS BY RX/DR IN RCRD: CPT | Performed by: OTOLARYNGOLOGY

## 2023-12-28 PROCEDURE — 1036F TOBACCO NON-USER: CPT | Performed by: OTOLARYNGOLOGY

## 2023-12-28 PROCEDURE — 1159F MED LIST DOCD IN RCRD: CPT | Performed by: OTOLARYNGOLOGY

## 2023-12-28 PROCEDURE — 92567 TYMPANOMETRY: CPT | Performed by: AUDIOLOGIST

## 2023-12-28 PROCEDURE — 1126F AMNT PAIN NOTED NONE PRSNT: CPT | Performed by: OTOLARYNGOLOGY

## 2023-12-28 PROCEDURE — 99204 OFFICE O/P NEW MOD 45 MIN: CPT | Performed by: OTOLARYNGOLOGY

## 2023-12-28 PROCEDURE — 92557 COMPREHENSIVE HEARING TEST: CPT | Performed by: AUDIOLOGIST

## 2023-12-28 ASSESSMENT — PAIN SCALES - GENERAL: PAINLEVEL_OUTOF10: 0 - NO PAIN

## 2023-12-28 ASSESSMENT — PAIN - FUNCTIONAL ASSESSMENT: PAIN_FUNCTIONAL_ASSESSMENT: 0-10

## 2023-12-28 NOTE — PROGRESS NOTES
"AUDIOMETRIC EVALUATION       Name:  Lisa Thakkar  :  1935  Age:  88 y.o.  Date of Evaluation:  2023     HISTORY  Lisa Thakkar was seen today for a hearing evaluation due to blocked feeling in both ears. She reports ears became blocked in 2023. Reports left ear hearing is better than her right ear. She reports difficulty hearing when people mumble or speak softly. She denies tinnitus, vertigo, aural pain, drainage. She reports a history of noise exposure as a . Debris removed from both ears by Wang Roberts MD prior to today's hearing evaluation.    PROCEDURE:  Otoscopic Evaluation:    RIGHT: Clear ear canal and tympanic membrane visualized.  LEFT:  Clear ear canal and tympanic membrane visualized.    Immittance:    Tympanometry (226 Hz probe tone) and Stapedial Acoustic Reflexes Thresholds (ART)(Probe ear):  RIGHT: Normal middle ear pressure, reduced mobility, and normal hearing canal volume. Ipsilateral ART absent 500-2000 Hz.  LEFT: Normal ear canal volume with reduced mobility. Ipsilateral ART absent 500-2000 Hz.    Pure Tone and Speech Audiometry:    Test Technique: Pure Tone Audiometry via TDH headphones  Test Reliability: good    RIGHT: Moderate to severe sensorineural hearing loss through 8000 Hz.. Word Recognition score was fair using recorded material (NU-6 25-word list).   LEFT:  Mild to severe sensorineural hearing loss through 8000 Hz.. Word Recognition score was excellent using recorded material (NU-6 25-word list).     EVALUATION  See scanned Audiogram in \"Media\".    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 Abnormal middle ear function, bilaterally. Debris removed from both ears canal by Wang Roberts MD prior to today's testing. She does not feel as though she needs amplification at this time.    Discussed audiogram at length including speech sounds in which access is limited due to the hearing loss. " Discussed implications of hearing loss as well as benefits of amplification. Communication strategies were discussed (utilizing visual cues/gestures; reducing background noise and distance from desired source; increasing light to assist with visual cues; use of clear speech).     RECOMMENDATIONS:  Continue medical follow-up with physician.  Return for audiologic assessment in conjunction with otologic care or annually.   Amplification options briefly discussed and literature provided re: hearing aids. Consider amplification pending medical clearance. Check insurance benefit for hearing aid benefits and in-network providers. To schedule an appointment for a hearing aid consultation call [phone].  Implement communication strategies (utilizing visual cues/gestures; reducing background noise and distance from desired source; increasing light to assist with visual cues; use of clear speech).     PATIENT EDUCATION:   Discussed results and recommendations with Lisa Thakkar.  Questions were addressed and the patient was encouraged to contact our department ([phone]) should concerns arise.    SHERON Castellanos, CCC-A  Senior Clinical Audiologist    TIME: [phone]

## 2023-12-28 NOTE — PROGRESS NOTES
History of present illness:  Lisa Thakkar is a 88 y.o. female presenting today for E/M of difficulty hearing conversations. She reports difficulty hearing conversations with people and when she washes her ears but has no difficulty listening to the television. She mentions associated itching and ear clogging.    The patient's current medications, active allergies and list of medical problems were reviewed in the EHR and confirmed electronically there are as follows;    Allergies:   Allergies   Allergen Reactions    Amoxicillin Unknown    Axid [Nizatidine] Unknown    Bactrim [Sulfamethoxazole-Trimethoprim] Unknown    Celebrex [Celecoxib] Unknown    Ciprofloxacin Unknown    Clindamycin Unknown    Cortisone Unknown    Erythromycin Unknown    Hydrocortisone Unknown    Lansoprazole Unknown    Methylprednisolone Unknown    Niacin Unknown    Prevacid Naprapac [Lansoprazole-Naproxen] Unknown    Protonix [Pantoprazole] Unknown    Robaxin [Methocarbamol] Unknown    Sulfa (Sulfonamide Antibiotics) Unknown    Welchol [Colesevelam] Unknown     Problem list:  Patient Active Problem List   Diagnosis    Acquired trigger finger    Acute urinary tract infection    AD (atopic dermatitis)    Allergic rhinitis    Arthritis of hand, left, degenerative    Arthritis of hand, right, degenerative    CAD (coronary artery disease)    Chest pain    Chronic depression    Chronic GERD    Chronic low back pain    Chronic right-sided low back pain with right-sided sciatica    Chronic non-infective otitis externa of left ear    Chronic renal insufficiency    CKD (chronic kidney disease) stage 3, GFR 30-59 ml/min (CMS/HCC)    Current mild episode of major depressive disorder without prior episode (CMS/HCC)    Cutaneous skin tags    Dark stools    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, without long-term current use of insulin (CMS/HCC)    Edema    Fall from steps    Fatigue    Flexor tenosynovitis of finger    Grief     Hair loss    Hand edema    Hematuria    Hemorrhoids    Hyperlipidemia    Influenza A    Inflamed nasal mucosa    Injury of ring finger    Intertrigo    Itching of ear    Limb pain    Loose stools    Nephrolithiasis    Contact dermatitis    Hydronephrosis of right kidney    OAB (overactive bladder)    Osteopenia    Pain of right hand    Paronychia, finger    Renal insufficiency    Right ureteral stone    S/P CABG x 4    Epiphora of right side    Tearing eyes    Age-related nuclear cataract of left eye    Age-related nuclear cataract of right eye    Cortical age-related cataract of left eye    Cortical age-related cataract of right eye    Posterior subcapsular polar age-related cataract of left eye    Traumatic cataract of left eye    Ureteral stricture, right    Urinary incontinence    Urinary urgency    Vaginal itching    Vitamin B12 deficiency    Vitamin D deficiency    Type 2 diabetes mellitus (CMS/HCC)    Class 1 obesity with serious comorbidity and body mass index (BMI) of 30.0 to 30.9 in adult     Current list of medications:   Current Outpatient Medications   Medication Sig Dispense Refill    aspirin 81 mg EC tablet Take 1 tablet (81 mg) by mouth once daily.      biotin 5 mg capsule Take 1 capsule (5 mg) by mouth every other day.      blood sugar diagnostic (Prodigy No Coding) strip Test twice daily      cephalexin (Keflex) 500 mg capsule Take 1 capsule (500 mg) by mouth 2 times a day.      cholecalciferol (Vitamin D-3) 25 MCG (1000 UT) tablet Take 1 tablet (25 mcg) by mouth once daily.      FreeStyle lancets 28 gauge Test twice a day      glimepiride (Amaryl) 4 mg tablet Take 0.5 tablets (2 mg) by mouth 2 times a day.      metFORMIN (Glucophage) 500 mg tablet Take 1 tablet (500 mg) by mouth 2 times a day with meals.      metoprolol tartrate (Lopressor) 50 mg tablet Take 1 tablet by mouth 2 times a day.      multivitamin/iron/folic acid (CENTRUM ORAL) Take 1 tablet by mouth 1 (one) time each day. (Centrum  Adult Oral Tablet)      phenylephrine-cocoa butter (Hemorrhoidal) 0.25-88.44 % suppository Preparation H 0.25-88.44 % Rectal Suppository   Refills: 0        Start : 28-Nov-2022   Active      simvastatin (Zocor) 20 mg tablet Take 1 tablet (20 mg) by mouth once daily at bedtime.       No current facility-administered medications for this visit.     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    Encounter for gynecological examination (general) (routine) without abnormal findings 06/06/2022    Women's annual routine gynecological examination    Other conditions influencing health status 05/21/2014    Follow-up arranged    Other conditions influencing health status 08/28/2014    Follow-up arranged    Personal history of diseases of the blood and blood-forming organs and certain disorders involving the immune mechanism 04/20/2016    History of anemia    Personal history of diseases of the blood and blood-forming organs and certain disorders involving the immune mechanism     History of bleeding disorder    Personal history of diseases of the skin and subcutaneous tissue     History of urticaria    Personal history of malignant neoplasm, unspecified     History of malignant neoplasm    Personal history of other endocrine, nutritional and metabolic disease     History of hyperlipidemia    Personal history of other endocrine, nutritional and metabolic disease     History of diabetes mellitus    Personal history of urinary calculi     History of renal calculi    Pruritus, unspecified 09/25/2015    Itching of ear    Unspecified cataract     Cataract, right eye    Unspecified cataract     Cataract, left    Vitreous degeneration, left eye     PVD (posterior vitreous detachment), left eye     Surgical history:  Past Surgical History:   Procedure Laterality Date    BREAST SURGERY   01/19/2017    Breast Surgery    CARDIAC SURGERY  01/19/2017    Heart Surgery    CATARACT EXTRACTION  07/20/2017    Cataract Surgery    CORONARY ARTERY BYPASS GRAFT  02/10/2017    CABG    HAND SURGERY  01/19/2017    Hand Surgery                                                                                                                                                          HYSTERECTOMY  10/30/2014    Hysterectomy    TONSILLECTOMY  10/30/2014    Tonsillectomy     Family history:  Family History   Problem Relation Name Age of Onset    Other (cardiac disorder) Mother      Dementia Mother      Ulcers Mother      Osteoporosis Mother      Colon cancer Father      Ulcers Father      Breast cancer Mother's Sister       Social history:  Social History     Tobacco Use    Smoking status: Never    Smokeless tobacco: Never   Vaping Use    Vaping Use: Never used   Substance Use Topics    Alcohol use: Never    Drug use: Never       Physical Examination:  CONSTITUTIONAL:  No acute distress  VOICE:  No hoarseness or other abnormality  RESPIRATION:  Breathing comfortably, no stridor  CV:  No clubbing/cyanosis/edema in hands  EYES:  EOM intact, sclera clear  NEURO:  Alert and oriented times 3, Cranial nerves II-XII grossly intact and symmetric bilaterally  HEAD AND FACE:  Symmetric facial features, no masses or lesions  RIGHT EAR:  Normal external ear and post auricular area, no visible lesions, external auditory canal patent, tympanic membrane intact, no retraction, no signs of mass, effusion, or infection within the middle ear  LEFT EAR: Normal external ear and post auricular area, no visible lesions, external auditory canal patent, tympanic membrane intact, no retraction, no signs of mass, effusion, or infection within the middle ear  NOSE:  Anterior rhinoscopy clear, no significant external deformity.  ORAL CAVITY/OROPHARYNX/LIPS:  normal lining, mobile tongue.  PHARYNGEAL WALLS: Moist mucosal lining, good palatal  elevation  NECK/LYMPH:  No LAD, no thyroid masses, trachea midline  SKIN:  Neck and facial skin is without scar or injury  PSYCH:  Alert and oriented with appropriate mood and affect    Diagnostic testing:    Audiometry:  Audiometry testing done on 12/28/23 reveals:  On the right: Moderate to severe sensorineural hearing loss. Speech discrimination is 52%. Type A tympanogram.  On the left:Mild to severe sensorineural hearing loss . Speech discrimination is 92%. Type B tympanogram    I personally reviewed the available patient's external record and independently reviewed their audiometric testing [and] radiographic imaging through the appropriate viewing software as detailed in my note and agree with the detailed report.      Impression:  Left cerumen impaction and Dry Skin  Asymmetrical Hearing Loss    Recommendation:  I recommended hearing amplification but she does not feel that she needs it. She has a cardiac stent that is not MR compatible, I recommended a CT posterior fossa.        Scribe Attestation  By signing my name below, Alexia RUBY, Scribe   attest that this documentation has been prepared under the direction and in the presence of Wang Roberts MD.

## 2024-01-02 ENCOUNTER — TELEPHONE (OUTPATIENT)
Dept: CARDIOLOGY | Facility: CLINIC | Age: 89
End: 2024-01-02
Payer: MEDICARE

## 2024-01-09 ENCOUNTER — APPOINTMENT (OUTPATIENT)
Dept: RADIOLOGY | Facility: CLINIC | Age: 89
End: 2024-01-09
Payer: MEDICARE

## 2024-02-07 ENCOUNTER — OFFICE VISIT (OUTPATIENT)
Dept: CARDIOLOGY | Facility: HOSPITAL | Age: 89
End: 2024-02-07
Payer: MEDICARE

## 2024-02-07 VITALS
DIASTOLIC BLOOD PRESSURE: 75 MMHG | BODY MASS INDEX: 29.64 KG/M2 | WEIGHT: 157 LBS | HEIGHT: 61 IN | HEART RATE: 74 BPM | SYSTOLIC BLOOD PRESSURE: 150 MMHG

## 2024-02-07 DIAGNOSIS — I25.10 CORONARY ARTERY DISEASE INVOLVING NATIVE CORONARY ARTERY OF NATIVE HEART WITHOUT ANGINA PECTORIS: Primary | ICD-10-CM

## 2024-02-07 PROCEDURE — 1036F TOBACCO NON-USER: CPT | Performed by: INTERNAL MEDICINE

## 2024-02-07 PROCEDURE — 1159F MED LIST DOCD IN RCRD: CPT | Performed by: INTERNAL MEDICINE

## 2024-02-07 PROCEDURE — 93005 ELECTROCARDIOGRAM TRACING: CPT | Performed by: INTERNAL MEDICINE

## 2024-02-07 PROCEDURE — 1160F RVW MEDS BY RX/DR IN RCRD: CPT | Performed by: INTERNAL MEDICINE

## 2024-02-07 PROCEDURE — 99214 OFFICE O/P EST MOD 30 MIN: CPT | Performed by: INTERNAL MEDICINE

## 2024-02-07 PROCEDURE — 93010 ELECTROCARDIOGRAM REPORT: CPT | Performed by: INTERNAL MEDICINE

## 2024-02-07 PROCEDURE — 1126F AMNT PAIN NOTED NONE PRSNT: CPT | Performed by: INTERNAL MEDICINE

## 2024-02-07 RX ORDER — METOPROLOL TARTRATE 50 MG/1
50 TABLET ORAL 2 TIMES DAILY
Qty: 180 TABLET | Refills: 3 | Status: SHIPPED | OUTPATIENT
Start: 2024-02-07 | End: 2025-02-06

## 2024-02-07 RX ORDER — SIMVASTATIN 20 MG/1
20 TABLET, FILM COATED ORAL NIGHTLY
Qty: 90 TABLET | Refills: 3 | Status: SHIPPED | OUTPATIENT
Start: 2024-02-07 | End: 2025-02-06

## 2024-02-07 NOTE — PROGRESS NOTES
Subjective:  Lisa returns for a routine 6-month follow-up.  She is a delightful 88-year-old female we have followed for some time for risk factor management as well as known coronary artery disease.  She is now over 7 years out following CABG surgery.  She received a LIMA-LAD, an SVG-ramus, and an SVG-RPDA.    She generally has been clinically stable since her last visit.  She has not had any recurrent hospitalizations for any reason.  She denies any other new health concerns.  She did have a rather stressful drive coming in and is concerned about her blood pressure reading today.  She is getting around reasonably good activity level without any chest discomfort or dyspnea.  She denies any palpitations.  Her medications remain stable and she is taking all of these compliantly without side effect.  She overall generally appears to be doing quite well.    Objective:  General: Alert, usual pleasant but anxious elderly self.  HEENT: Unchanged.  Lungs: Clear without crackles.  Cardiac: Normal S1 and S2 with very faint systolic murmur.  Abdomen: Nontender.  Extremities: No edema.  Skin: No rash.  Neuro: Grossly intact and unchanged.    EKG: Normal sinus rhythm.  Left axis deviation.  Right bundle branch block.  No acute changes.    Lipid panel: Cholesterol-127, HDL-50, LDL-38, TG-195.    Impression/plan:  Lisa is doing quite well at this time following remote CABG surgery.  She remains free of any concerning symptoms suggesting that we are not dealing with any compelling graft failure.  Her heart rate and blood pressure are under reasonable control at this time.  She remains on appropriate antiplatelet therapy and her lipid panel looks excellent on her current simvastatin dosing.  I elected to embark on no repeat testing at this time and we will plan on seeing her back briefly in 6 months.  She knows to call for any intercurrent concerns.  I took the liberty of renewing some of her medications for her.    Patient  instructions:    Continue current medications unchanged.    Return to clinic in 6 months.    Call for any intercurrent problems.

## 2024-02-08 LAB
ATRIAL RATE: 74 BPM
P AXIS: 59 DEGREES
P OFFSET: 214 MS
P ONSET: 152 MS
PR INTERVAL: 154 MS
Q ONSET: 229 MS
QRS COUNT: 12 BEATS
QRS DURATION: 122 MS
QT INTERVAL: 404 MS
QTC CALCULATION(BAZETT): 448 MS
QTC FREDERICIA: 433 MS
R AXIS: -35 DEGREES
T AXIS: 34 DEGREES
T OFFSET: 431 MS
VENTRICULAR RATE: 74 BPM

## 2024-03-07 ENCOUNTER — TELEPHONE (OUTPATIENT)
Dept: OBSTETRICS AND GYNECOLOGY | Facility: CLINIC | Age: 89
End: 2024-03-07
Payer: MEDICARE

## 2024-03-07 DIAGNOSIS — R82.90 CLOUDY URINE: ICD-10-CM

## 2024-03-07 NOTE — TELEPHONE ENCOUNTER
RN returned Patient call, Patient verified  Urine was cloudy yesterday, clearer today.  Thinks she may have saw blood, but it might have been from a hemorrhoid  Patient denies burning, tenderness, pressure or fullness  RN informed Patient that an order will be placed for a urine culture  Patient verbalizes understanding  Janett Kapadia RN

## 2024-03-08 ENCOUNTER — LAB (OUTPATIENT)
Dept: LAB | Facility: LAB | Age: 89
End: 2024-03-08
Payer: MEDICARE

## 2024-03-08 DIAGNOSIS — R82.90 CLOUDY URINE: ICD-10-CM

## 2024-03-08 LAB
APPEARANCE UR: ABNORMAL
BACTERIA #/AREA URNS AUTO: ABNORMAL /HPF
BILIRUB UR STRIP.AUTO-MCNC: NEGATIVE MG/DL
CAOX CRY #/AREA UR COMP ASSIST: ABNORMAL /HPF
COLOR UR: YELLOW
GLUCOSE UR STRIP.AUTO-MCNC: ABNORMAL MG/DL
KETONES UR STRIP.AUTO-MCNC: NEGATIVE MG/DL
LEUKOCYTE ESTERASE UR QL STRIP.AUTO: ABNORMAL
NITRITE UR QL STRIP.AUTO: NEGATIVE
PH UR STRIP.AUTO: 5.5 [PH]
PROT UR STRIP.AUTO-MCNC: ABNORMAL MG/DL
RBC # UR STRIP.AUTO: ABNORMAL /UL
RBC #/AREA URNS AUTO: ABNORMAL /HPF
SP GR UR STRIP.AUTO: 1.02
SQUAMOUS #/AREA URNS AUTO: ABNORMAL /HPF
UROBILINOGEN UR STRIP.AUTO-MCNC: ABNORMAL MG/DL
WBC #/AREA URNS AUTO: >50 /HPF
WBC CLUMPS #/AREA URNS AUTO: ABNORMAL /HPF

## 2024-03-08 PROCEDURE — 87086 URINE CULTURE/COLONY COUNT: CPT

## 2024-03-08 PROCEDURE — 81001 URINALYSIS AUTO W/SCOPE: CPT

## 2024-03-08 PROCEDURE — 87186 SC STD MICRODIL/AGAR DIL: CPT

## 2024-03-09 LAB — HOLD SPECIMEN: NORMAL

## 2024-03-11 ENCOUNTER — TELEPHONE (OUTPATIENT)
Dept: OBSTETRICS AND GYNECOLOGY | Facility: CLINIC | Age: 89
End: 2024-03-11
Payer: MEDICARE

## 2024-03-11 RX ORDER — NITROFURANTOIN 25; 75 MG/1; MG/1
100 CAPSULE ORAL 2 TIMES DAILY
Qty: 14 CAPSULE | Refills: 0 | Status: SHIPPED | OUTPATIENT
Start: 2024-03-11 | End: 2024-03-18

## 2024-03-11 NOTE — TELEPHONE ENCOUNTER
RN left voicemail  Urine Culture positive for UTI  RN informed Patient that a prescription was sent to her pharmacy on file  Janett Kapadia RN

## 2024-03-15 LAB — BACTERIA UR CULT: ABNORMAL

## 2024-03-19 ENCOUNTER — OFFICE VISIT (OUTPATIENT)
Dept: ORTHOPEDIC SURGERY | Facility: CLINIC | Age: 89
End: 2024-03-19
Payer: MEDICARE

## 2024-03-19 ENCOUNTER — HOSPITAL ENCOUNTER (OUTPATIENT)
Dept: RADIOLOGY | Facility: CLINIC | Age: 89
Discharge: HOME | End: 2024-03-19
Payer: MEDICARE

## 2024-03-19 VITALS — HEIGHT: 61 IN | BODY MASS INDEX: 29.64 KG/M2 | WEIGHT: 157 LBS

## 2024-03-19 DIAGNOSIS — M65.30 TRIGGER FINGER OF RIGHT HAND, UNSPECIFIED FINGER: Primary | ICD-10-CM

## 2024-03-19 DIAGNOSIS — M19.041 OSTEOARTHRITIS OF METACARPOPHALANGEAL (MCP) JOINT OF RIGHT THUMB: ICD-10-CM

## 2024-03-19 DIAGNOSIS — M79.641 RIGHT HAND PAIN: ICD-10-CM

## 2024-03-19 PROCEDURE — 1036F TOBACCO NON-USER: CPT | Performed by: EMERGENCY MEDICINE

## 2024-03-19 PROCEDURE — 73130 X-RAY EXAM OF HAND: CPT | Mod: RT

## 2024-03-19 PROCEDURE — 99204 OFFICE O/P NEW MOD 45 MIN: CPT | Performed by: EMERGENCY MEDICINE

## 2024-03-19 PROCEDURE — 1125F AMNT PAIN NOTED PAIN PRSNT: CPT | Performed by: EMERGENCY MEDICINE

## 2024-03-19 PROCEDURE — 73130 X-RAY EXAM OF HAND: CPT | Mod: RIGHT SIDE | Performed by: RADIOLOGY

## 2024-03-19 PROCEDURE — 1159F MED LIST DOCD IN RCRD: CPT | Performed by: EMERGENCY MEDICINE

## 2024-03-19 PROCEDURE — 76942 ECHO GUIDE FOR BIOPSY: CPT | Performed by: EMERGENCY MEDICINE

## 2024-03-19 PROCEDURE — 1160F RVW MEDS BY RX/DR IN RCRD: CPT | Performed by: EMERGENCY MEDICINE

## 2024-03-19 PROCEDURE — 20550 NJX 1 TENDON SHEATH/LIGAMENT: CPT | Performed by: EMERGENCY MEDICINE

## 2024-03-19 RX ORDER — TRIAMCINOLONE ACETONIDE 40 MG/ML
20 INJECTION, SUSPENSION INTRA-ARTICULAR; INTRAMUSCULAR
Status: COMPLETED | OUTPATIENT
Start: 2024-03-19 | End: 2024-03-19

## 2024-03-19 RX ORDER — LIDOCAINE HYDROCHLORIDE 10 MG/ML
0.5 INJECTION INFILTRATION; PERINEURAL
Status: COMPLETED | OUTPATIENT
Start: 2024-03-19 | End: 2024-03-19

## 2024-03-19 RX ADMIN — LIDOCAINE HYDROCHLORIDE 0.5 ML: 10 INJECTION INFILTRATION; PERINEURAL at 14:18

## 2024-03-19 RX ADMIN — TRIAMCINOLONE ACETONIDE 20 MG: 40 INJECTION, SUSPENSION INTRA-ARTICULAR; INTRAMUSCULAR at 14:18

## 2024-03-19 ASSESSMENT — PAIN SCALES - GENERAL: PAINLEVEL_OUTOF10: 2

## 2024-03-19 ASSESSMENT — PAIN - FUNCTIONAL ASSESSMENT: PAIN_FUNCTIONAL_ASSESSMENT: 0-10

## 2024-03-19 NOTE — PROGRESS NOTES
Subjective    Patient ID: Lisa Thakkar is a 88 y.o. female.    Chief Complaint: Pain of the Right Hand (Thumb and middle trigger finger. She used to see Dr. Castillo for the same thing and got injections. Looks lie the last time was 8/10/2020/Xr today )     Last Surgery: No surgery found  Last Surgery Date: No surgery found    Lisa is a very pleasant 88-year-old female who is coming in with some pain in her right middle finger and at the base of her right thumb.  She has a history of carpal tunnel surgery on the right side.  She used to follow with Dr. Castillo and would get trigger finger injections.  She is coming back in saying that for the past several weeks she has had some clicking and popping with pain in her middle finger mostly involving the DIP joint getting stuck.  Her symptoms are worse in the morning and slightly improved throughout the day.  She is also having some acute on chronic pain at the base of her right thumb near her MCP joint.  No recent trauma.  She has responded well to injections in the past and would like to have an injection today saying that she wants to avoid surgery if possible.        Objective   Right Hand Exam     Tenderness   The patient is experiencing no tenderness.     Range of Motion   The patient has normal right wrist ROM.     Muscle Strength   The patient has normal right wrist strength.    Other   Erythema: absent  Sensation: normal  Pulse: present    Comments:  Patient does not have any triggering now with normal range of motion at the MCP PIP and DIP joints of the third finger.  Brisk cap refill.  Some discomfort with palpation over the palmar aspect of the third MCP area.  She also has a positive grind test at the first MCP joint.  Strength and sensation are intact involving the median ulnar and radial nerve distributions.      Left Hand Exam   Left hand exam is normal.            Image Results:  X-ray of the right hand was reviewed and interpreted by me on 3/19/2024 and  revealed degenerative changes mostly in the first MCP joint.  No evidence of acute injuries or fractures.    Patient ID: Lisa Thakkar is a 88 y.o. female.    Hand / UE Inj/Asp: R long A1 for trigger finger on 3/19/2024 2:18 PM  Indications: therapeutic  Details: 25 G needle, ultrasound-guided volar approach  Medications: 0.5 mL lidocaine 10 mg/mL (1 %); 20 mg triamcinolone acetonide 40 mg/mL  Outcome: tolerated well, no immediate complications  Procedure, treatment alternatives, risks and benefits explained, specific risks discussed. Consent was given by the patient. Immediately prior to procedure a time out was called to verify the correct patient, procedure, equipment, support staff and site/side marked as required. Patient was prepped and draped in the usual sterile fashion.           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    We discussed her treatment options and agreed to perform an ultrasound-guided cortisone injection of her right third A1 pulley to treat her trigger finger.  She tolerated the procedure well, which was performed by the sports medicine fellow.  No complications.  Activity modifications were reviewed.  She will return in about 1 month for reevaluation and at that time if she is still having pain in her thumb we could perform a cortisone injection at the first MCP joint.  In the meantime she will take Tylenol over-the-counter, use Voltaren at prescription dosing, and can use a Comfort Cool brace.    **Patient was prescribed a Comfort Cool for [right thumb MCP osteoarthritis]. The patient has weakness, instability and/or deformity of their right hand which requires stabilization from this orthosis to improve their function.        Verbal and written instructions for the use, wear schedule, cleaning and application of this item were given.  Patient was instructed that should the brace result in increased pain, decreased sensation, increased swelling, or an overall worsening of their medical condition, to please contact our office immediately.      Orthotic management and training was provided for skin care, modifications due to healing tissues, edema changes, interruption in skin integrity, and safety precautions with the orthosis.**    ** Please excuse any errors in grammar or translation related to this dictation. Voice recognition software was utilized to prepare this document. **       Nish Montgomery MD  [facility]

## 2024-03-28 ENCOUNTER — OFFICE VISIT (OUTPATIENT)
Dept: PRIMARY CARE | Facility: CLINIC | Age: 89
End: 2024-03-28
Payer: MEDICARE

## 2024-03-28 VITALS
DIASTOLIC BLOOD PRESSURE: 60 MMHG | TEMPERATURE: 96 F | SYSTOLIC BLOOD PRESSURE: 139 MMHG | BODY MASS INDEX: 29.14 KG/M2 | WEIGHT: 154.2 LBS | HEART RATE: 82 BPM | OXYGEN SATURATION: 93 %

## 2024-03-28 DIAGNOSIS — R80.9 DIABETES MELLITUS DUE TO UNDERLYING CONDITION WITH MICROALBUMINURIA, WITHOUT LONG-TERM CURRENT USE OF INSULIN (MULTI): ICD-10-CM

## 2024-03-28 DIAGNOSIS — N18.32 STAGE 3B CHRONIC KIDNEY DISEASE (MULTI): ICD-10-CM

## 2024-03-28 DIAGNOSIS — F32.0 CURRENT MILD EPISODE OF MAJOR DEPRESSIVE DISORDER WITHOUT PRIOR EPISODE (CMS-HCC): ICD-10-CM

## 2024-03-28 DIAGNOSIS — E78.2 MIXED HYPERLIPIDEMIA: ICD-10-CM

## 2024-03-28 DIAGNOSIS — E08.29 DIABETES MELLITUS DUE TO UNDERLYING CONDITION WITH MICROALBUMINURIA, WITHOUT LONG-TERM CURRENT USE OF INSULIN (MULTI): ICD-10-CM

## 2024-03-28 DIAGNOSIS — I10 PRIMARY HYPERTENSION: ICD-10-CM

## 2024-03-28 DIAGNOSIS — Z00.00 MEDICARE ANNUAL WELLNESS VISIT, SUBSEQUENT: Primary | ICD-10-CM

## 2024-03-28 DIAGNOSIS — I25.10 CORONARY ARTERY DISEASE INVOLVING NATIVE CORONARY ARTERY OF NATIVE HEART WITHOUT ANGINA PECTORIS: ICD-10-CM

## 2024-03-28 PROCEDURE — 3075F SYST BP GE 130 - 139MM HG: CPT | Performed by: INTERNAL MEDICINE

## 2024-03-28 PROCEDURE — 1159F MED LIST DOCD IN RCRD: CPT | Performed by: INTERNAL MEDICINE

## 2024-03-28 PROCEDURE — G0439 PPPS, SUBSEQ VISIT: HCPCS | Performed by: INTERNAL MEDICINE

## 2024-03-28 PROCEDURE — 3078F DIAST BP <80 MM HG: CPT | Performed by: INTERNAL MEDICINE

## 2024-03-28 PROCEDURE — 1170F FXNL STATUS ASSESSED: CPT | Performed by: INTERNAL MEDICINE

## 2024-03-28 PROCEDURE — 1124F ACP DISCUSS-NO DSCNMKR DOCD: CPT | Performed by: INTERNAL MEDICINE

## 2024-03-28 PROCEDURE — 1036F TOBACCO NON-USER: CPT | Performed by: INTERNAL MEDICINE

## 2024-03-28 PROCEDURE — 1160F RVW MEDS BY RX/DR IN RCRD: CPT | Performed by: INTERNAL MEDICINE

## 2024-03-28 ASSESSMENT — ENCOUNTER SYMPTOMS
OCCASIONAL FEELINGS OF UNSTEADINESS: 0
LOSS OF SENSATION IN FEET: 0
CARDIOVASCULAR NEGATIVE: 1
NEUROLOGICAL NEGATIVE: 1
DEPRESSION: 0
CONSTITUTIONAL NEGATIVE: 1
HEMATOLOGIC/LYMPHATIC NEGATIVE: 1
PSYCHIATRIC NEGATIVE: 1
ENDOCRINE NEGATIVE: 1
EYES NEGATIVE: 1
RESPIRATORY NEGATIVE: 1
GASTROINTESTINAL NEGATIVE: 1
MUSCULOSKELETAL NEGATIVE: 1
ALLERGIC/IMMUNOLOGIC NEGATIVE: 1

## 2024-03-28 ASSESSMENT — PATIENT HEALTH QUESTIONNAIRE - PHQ9
2. FEELING DOWN, DEPRESSED OR HOPELESS: NOT AT ALL
SUM OF ALL RESPONSES TO PHQ9 QUESTIONS 1 AND 2: 0
1. LITTLE INTEREST OR PLEASURE IN DOING THINGS: NOT AT ALL

## 2024-03-28 ASSESSMENT — ACTIVITIES OF DAILY LIVING (ADL)
DOING_HOUSEWORK: INDEPENDENT
GROCERY_SHOPPING: INDEPENDENT
TAKING_MEDICATION: INDEPENDENT
MANAGING_FINANCES: INDEPENDENT
BATHING: INDEPENDENT
DRESSING: INDEPENDENT

## 2024-03-28 NOTE — PROGRESS NOTES
Subjective   Patient ID: Lisa Thakkar is a 89 y.o. female who presents for Medicare Annual Wellness Visit Subsequent.  Lipids have been well controlled on current medication regimen.  Denies myalgias or other side effects of medication.  This is managed by Cardiology.    Patient presents in follow up re:  Type 2 diabetes.  Patient has been compliant with medical therapy as prescribed and mostly compliant with diet and exercise recommendations.  HgbA1c has been maintained at goal level.  No hypoglycemia reported and no other associated complaints.  This is managed by Endo.          Review of Systems   Constitutional: Negative.    HENT: Negative.     Eyes: Negative.    Respiratory: Negative.     Cardiovascular: Negative.    Gastrointestinal: Negative.    Endocrine: Negative.    Genitourinary: Negative.    Musculoskeletal: Negative.    Skin: Negative.    Allergic/Immunologic: Negative.    Neurological: Negative.    Hematological: Negative.    Psychiatric/Behavioral: Negative.         Objective     Blood pressure 139/60, pulse 82, temperature 35.6 °C (96 °F), temperature source Temporal, weight 69.9 kg (154 lb 3.2 oz), SpO2 93 %.   Physical Exam  Constitutional:       Appearance: Normal appearance.   Neck:      Vascular: No carotid bruit.   Cardiovascular:      Rate and Rhythm: Normal rate and regular rhythm.      Pulses: Normal pulses.      Heart sounds: Normal heart sounds. No murmur heard.  Pulmonary:      Effort: Pulmonary effort is normal.      Breath sounds: Normal breath sounds. No wheezing or rales.   Abdominal:      General: Abdomen is flat. There is no distension.      Palpations: Abdomen is soft.      Tenderness: There is no abdominal tenderness.   Musculoskeletal:         General: Normal range of motion.      Cervical back: Normal range of motion and neck supple.   Skin:     General: Skin is warm and dry.   Neurological:      General: No focal deficit present.      Mental Status: She is alert and oriented to  person, place, and time.   Psychiatric:         Mood and Affect: Mood normal.         Behavior: Behavior normal.         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  Patient does not have advanced care planning in place.  This is discussed and form is given today  Sugars well controlled overall with A1c at goal.  Will continue present medical therapy as per Endo.  She has appt with Endo next week.  LDL at goal on medication per Cardiology.  No adverse effects from medication.  Advised to continue present therapy.  BP well controlled on current therapy per Cardiology.  Advised to continue present therapy.    Since all current medical care is per the subspecialists, Patient is asked to follow up annually or prn

## 2024-04-01 NOTE — PROGRESS NOTES
Subjective   Lisa Thakkar is a 89 y.o. female who presents for follow-up of Type 2 diabetes mellitus. The initial diagnosis of diabetes was made {ago/age:26402}. The patient {DOES/ DOES NOT:03926} have a known family history of diabetes.    Known complications due to diabetes included {Diagnoses; complications diabetes:1215}    Cardiovascular risk factors include {risk factors heart disease:510}. The patient {is/is not:39112} on an ACE inhibitor or angiotensin II receptor blocker.  The patient {HAS HAS NOT:80172} been previously hospitalized due to diabetic ketoacidosis.     Current symptoms/problems include {Symptoms; diabetes:44367}. Her {course:02294}.     Current diabetes regimen is as follows: {therapy; diabetes meds:84532}     The patient {is/is not:87342} currently checking the blood glucose *** times per day.    Patient is using: {glucometer/continuous glucose monitor:32071}    Hypoglycemia frequency: ***  Hypoglycemia awareness: {YES/NO:10126}  The patient has had no major changes to her health since her last appointment. She had kidney stones. She fell down garage steps and she cracked her head in two places. She had staples placed.      Current Medication RegimenÂ    Metformin 500mg twice daily   Glimepiride 2mg twice daily   Simvastatin 20 mg once daily     MEALS:   Breakfast - toast, bananas, handful of nuts, cranberries, cookie   Lunch - cheese sticks, crackers  DinnerÂ Â - TV dinner, casserole with noodles, eggplant Parmesan, pasta with homemade sauce   Snacks - denies   Beverages - water, chocolate milk, diet coke          Review of Systems    Objective   There were no vitals taken for this visit.  Physical Exam  Vitals and nursing note reviewed.   Constitutional:       General: She is not in acute distress.     Appearance: Normal appearance. She is normal weight.   HENT:      Head: Normocephalic and atraumatic.      Nose: Nose normal.      Mouth/Throat:      Mouth: Mucous membranes are moist.    Eyes:      Extraocular Movements: Extraocular movements intact.   Cardiovascular:      Rate and Rhythm: Normal rate and regular rhythm.   Pulmonary:      Effort: Pulmonary effort is normal.      Breath sounds: Normal breath sounds.   Musculoskeletal:         General: Normal range of motion.   Skin:     General: Skin is warm.   Neurological:      Mental Status: She is alert and oriented to person, place, and time.   Psychiatric:         Mood and Affect: Mood normal.         Lab Review  Glucose (mg/dL)   Date Value   09/15/2023 168 (H)   05/03/2023 238 (H)   04/25/2023 208 (H)     Hemoglobin A1C (%)   Date Value   09/15/2023 9.0 (A)   03/13/2023 8.6 (A)   11/23/2022 8.9 (A)     Bicarbonate (mmol/L)   Date Value   09/15/2023 27   05/03/2023 26   04/25/2023 25     Urea Nitrogen (mg/dL)   Date Value   09/15/2023 15   05/03/2023 19   04/25/2023 22     Creatinine (mg/dL)   Date Value   09/15/2023 1.25 (H)   05/03/2023 1.21 (H)   04/25/2023 1.24 (H)       Health Maintenance:   Foot Exam:   Eye Exam:  Lipid Panel:  Urine Albumin:    Assessment/Plan      88-year-old female presents for follow-up for type 2 diabetes. Her last hemoglobin A1c was found to be 9% as of September 2023. Her blood pressure is elevated above goal. She is noted to be on a statin.

## 2024-04-02 ENCOUNTER — APPOINTMENT (OUTPATIENT)
Dept: ENDOCRINOLOGY | Facility: CLINIC | Age: 89
End: 2024-04-02
Payer: MEDICARE

## 2024-04-10 ENCOUNTER — LAB (OUTPATIENT)
Dept: LAB | Facility: LAB | Age: 89
End: 2024-04-10
Payer: MEDICARE

## 2024-04-10 DIAGNOSIS — E11.9 TYPE 2 DIABETES MELLITUS WITHOUT COMPLICATIONS (MULTI): Primary | ICD-10-CM

## 2024-04-10 LAB
ANION GAP SERPL CALC-SCNC: 14 MMOL/L (ref 10–20)
BUN SERPL-MCNC: 20 MG/DL (ref 6–23)
CALCIUM SERPL-MCNC: 9.4 MG/DL (ref 8.6–10.3)
CHLORIDE SERPL-SCNC: 104 MMOL/L (ref 98–107)
CO2 SERPL-SCNC: 25 MMOL/L (ref 21–32)
CREAT SERPL-MCNC: 1.19 MG/DL (ref 0.5–1.05)
EGFRCR SERPLBLD CKD-EPI 2021: 44 ML/MIN/1.73M*2
GLUCOSE SERPL-MCNC: 196 MG/DL (ref 74–99)
POTASSIUM SERPL-SCNC: 4.1 MMOL/L (ref 3.5–5.3)
SODIUM SERPL-SCNC: 139 MMOL/L (ref 136–145)

## 2024-04-10 PROCEDURE — 80048 BASIC METABOLIC PNL TOTAL CA: CPT

## 2024-04-10 PROCEDURE — 83036 HEMOGLOBIN GLYCOSYLATED A1C: CPT

## 2024-04-10 PROCEDURE — 36415 COLL VENOUS BLD VENIPUNCTURE: CPT

## 2024-04-11 LAB
EST. AVERAGE GLUCOSE BLD GHB EST-MCNC: 237 MG/DL
HBA1C MFR BLD: 9.9 %

## 2024-04-15 ENCOUNTER — TELEMEDICINE (OUTPATIENT)
Dept: ENDOCRINOLOGY | Facility: CLINIC | Age: 89
End: 2024-04-15
Payer: MEDICARE

## 2024-04-15 DIAGNOSIS — E11.9 TYPE 2 DIABETES MELLITUS WITHOUT COMPLICATION, WITHOUT LONG-TERM CURRENT USE OF INSULIN (MULTI): Primary | ICD-10-CM

## 2024-04-15 PROCEDURE — 1160F RVW MEDS BY RX/DR IN RCRD: CPT | Performed by: INTERNAL MEDICINE

## 2024-04-15 PROCEDURE — 1036F TOBACCO NON-USER: CPT | Performed by: INTERNAL MEDICINE

## 2024-04-15 PROCEDURE — 1159F MED LIST DOCD IN RCRD: CPT | Performed by: INTERNAL MEDICINE

## 2024-04-15 PROCEDURE — 1123F ACP DISCUSS/DSCN MKR DOCD: CPT | Performed by: INTERNAL MEDICINE

## 2024-04-15 PROCEDURE — 99442 PR PHYS/QHP TELEPHONE EVALUATION 11-20 MIN: CPT | Performed by: INTERNAL MEDICINE

## 2024-04-15 RX ORDER — PIOGLITAZONEHYDROCHLORIDE 30 MG/1
30 TABLET ORAL DAILY
Qty: 30 TABLET | Refills: 5 | Status: SHIPPED | OUTPATIENT
Start: 2024-04-15 | End: 2024-10-12

## 2024-04-15 ASSESSMENT — ENCOUNTER SYMPTOMS
SLEEP DISTURBANCE: 1
UNEXPECTED WEIGHT CHANGE: 1
FATIGUE: 1

## 2024-04-15 NOTE — PROGRESS NOTES
Subjective   Lisa Thakkar is a 89 y.o. female who presents for follow-up of Type 2 diabetes mellitus.    She asked to be switched to a virtual appointment today as she was tired and did not sleep well.    She had a bladder infection recently.      She had trigger finger for which she received a shot 3/19    Known complications due to diabetes included CAD s/p 4v CABG and chronic kidney disease    Cardiovascular risk factors include advanced age (older than 55 for men, 65 for women), diabetes mellitus, and microalbuminuria. The patient is not on an ACE inhibitor or angiotensin II receptor blocker.  The patient has not been previously hospitalized due to diabetic ketoacidosis.     Current symptoms/problems include none. Her clinical course has been stable.     The patient is currently checking the blood glucose.      Patient is using: glucometer  Today 180mg/dL upon recheck   Typically 172-199mg/dL     Hypoglycemia frequency: Denies   Hypoglycemia awareness: N/A    Current Medication Regimen  Metformin 500mg twice daily   Glimepiride 2mg twice daily (intolerant of the 4 mg dose at this does caused increased bowel movements)     MEALS:   Breakfast - toast, bananas, handful of nuts, cranberries, cookie   Lunch -  salad   Dinner - TV dinner, macaroni and cheese, sponge cake   Snacks - nuts    Beverages - water with each meal, chocolate milk, diet coke        Review of Systems   Constitutional:  Positive for fatigue and unexpected weight change (Weight loss).   Genitourinary:         Nocturia x 1    Neurological:         Memory impairment    Psychiatric/Behavioral:  Positive for sleep disturbance.    All other systems reviewed and are negative.      Objective   There were no vitals taken for this visit.      Lab Review  Glucose (mg/dL)   Date Value   04/10/2024 196 (H)   09/15/2023 168 (H)   05/03/2023 238 (H)   04/25/2023 208 (H)     Hemoglobin A1C (%)   Date Value   04/10/2024 9.9 (H)   09/15/2023 9.0 (A)   03/13/2023  "8.6 (A)   11/23/2022 8.9 (A)     Bicarbonate (mmol/L)   Date Value   04/10/2024 25   09/15/2023 27   05/03/2023 26   04/25/2023 25     Urea Nitrogen (mg/dL)   Date Value   04/10/2024 20   09/15/2023 15   05/03/2023 19   04/25/2023 22     Creatinine (mg/dL)   Date Value   04/10/2024 1.19 (H)   09/15/2023 1.25 (H)   05/03/2023 1.21 (H)   04/25/2023 1.24 (H)     Lab Results   Component Value Date    CHOL 127 09/15/2023    CHOL 136 06/29/2022    CHOL 134 10/20/2021     Lab Results   Component Value Date    HDL 50.3 09/15/2023    HDL 54.9 06/29/2022    HDL 52.5 10/20/2021     No results found for: \"LDLCALC\"  Lab Results   Component Value Date    TRIG 195 (H) 09/15/2023    TRIG 188 (H) 06/29/2022    TRIG 192 (H) 10/20/2021     Health Maintenance:   Foot Exam: September 2023  Eye Exam: August 2023  Urine Albumin: September 15, 2023    Assessment/Plan      89-year-old female presents for follow-up for type 2 diabetes. Her last hemoglobin A1c has worsened.  She is noted to be on a statin.     Type 2 diabetes mellitus (Multi)  To obtain blood tests before the next appointment   To continue Metformin 500mg twice daily   To continue Glimepiride 2mg twice daily   To commence Pioglitazone 30mg once daily   Goal: blood sugars 100-200mg/dL   Counseled that the goal A1C should be 8% or less  Counseled glycemic control is warranted to prevent microvascular complications  Please check blood sugars 1-2 times daily and keep a detailed log    For follow up in 4-6 months with repeat labs       "

## 2024-04-15 NOTE — PATIENT INSTRUCTIONS
Thank you for choosing [facility]  for your health care needs.  If you have any questions, concerns or medical needs, please feel free to contact our office at [phone].    Please ensure you complete your blood work one week before the next scheduled appointment.    To obtain blood tests before the next appointment   To continue Metformin 500mg twice daily   To continue Glimepiride 2mg twice daily   To commence Pioglitazone 30mg once daily   Goal: blood sugars 100-200mg/dL   Counseled that the goal A1C should be 8% or less  Counseled glycemic control is warranted to prevent microvascular complications  For follow up in 4-6 months with repeat labs

## 2024-04-15 NOTE — ASSESSMENT & PLAN NOTE
To obtain blood tests before the next appointment   To continue Metformin 500mg twice daily   To continue Glimepiride 2mg twice daily   To commence Pioglitazone 30mg once daily   Goal: blood sugars 100-200mg/dL   Counseled that the goal A1C should be 8% or less  Counseled glycemic control is warranted to prevent microvascular complications  Please check blood sugars 1-2 times daily and keep a detailed log    For follow up in 4-6 months with repeat labs

## 2024-04-16 ENCOUNTER — APPOINTMENT (OUTPATIENT)
Dept: ORTHOPEDIC SURGERY | Facility: CLINIC | Age: 89
End: 2024-04-16
Payer: MEDICARE

## 2024-05-06 ENCOUNTER — TELEPHONE (OUTPATIENT)
Dept: ENDOCRINOLOGY | Facility: CLINIC | Age: 89
End: 2024-05-06
Payer: MEDICARE

## 2024-05-06 NOTE — TELEPHONE ENCOUNTER
"Patient states since her last visit she has been having gi upset and frequent bowel movements, tiredness, and \"low blood sugars\" (ranging 130-117mg/dl)    She states that she doesn't feel like herself and doesn't feel like doing anything. She wants to know if she can stop glimepiride. She believes pioglitazone, metformin, and glimepiride is too much.     Please advise.  "

## 2024-05-09 NOTE — TELEPHONE ENCOUNTER
"Relayed to patient. Patient states the \"little green pill\" is the one she doesn't like. When asked what's the name of the green pill she could not answer. She states she just doesn't like it but did not provide reasoning. It was reiterated that she is to continue all medications as prescribed with the expect of metformin being decreased to once daily.    Patient would like to know if she should take the dose in the morning or after noon? Please advise  "

## 2024-05-23 NOTE — TELEPHONE ENCOUNTER
Patient called back to follow up on this message. She states she has taken pioglitazone for a month. She c/o swelling in legs and ankles, 8 lb weight gain which she has since lost, and she's tire and having cold sweats. She states that her blood sugar drops too low at 98mg/dL. She states she has not taken pioglitzaone in 3 days. Please advise.

## 2024-07-23 DIAGNOSIS — E11.9 TYPE 2 DIABETES MELLITUS WITHOUT COMPLICATION, WITHOUT LONG-TERM CURRENT USE OF INSULIN (MULTI): Primary | ICD-10-CM

## 2024-07-23 RX ORDER — GLIMEPIRIDE 2 MG/1
2 TABLET ORAL 2 TIMES DAILY
Qty: 180 TABLET | Refills: 1 | Status: SHIPPED | OUTPATIENT
Start: 2024-07-23 | End: 2025-01-19

## 2024-07-24 ENCOUNTER — TELEPHONE (OUTPATIENT)
Dept: ENDOCRINOLOGY | Facility: CLINIC | Age: 89
End: 2024-07-24

## 2024-07-24 ENCOUNTER — OFFICE VISIT (OUTPATIENT)
Dept: CARDIOLOGY | Facility: HOSPITAL | Age: 89
End: 2024-07-24
Payer: MEDICARE

## 2024-07-24 VITALS
HEART RATE: 88 BPM | BODY MASS INDEX: 29.7 KG/M2 | DIASTOLIC BLOOD PRESSURE: 80 MMHG | WEIGHT: 157.3 LBS | HEIGHT: 61 IN | SYSTOLIC BLOOD PRESSURE: 140 MMHG

## 2024-07-24 DIAGNOSIS — R06.02 SHORTNESS OF BREATH: Primary | ICD-10-CM

## 2024-07-24 PROCEDURE — 93005 ELECTROCARDIOGRAM TRACING: CPT | Performed by: INTERNAL MEDICINE

## 2024-07-24 PROCEDURE — 1159F MED LIST DOCD IN RCRD: CPT | Performed by: INTERNAL MEDICINE

## 2024-07-24 PROCEDURE — 1036F TOBACCO NON-USER: CPT | Performed by: INTERNAL MEDICINE

## 2024-07-24 PROCEDURE — 1123F ACP DISCUSS/DSCN MKR DOCD: CPT | Performed by: INTERNAL MEDICINE

## 2024-07-24 PROCEDURE — 93010 ELECTROCARDIOGRAM REPORT: CPT | Performed by: INTERNAL MEDICINE

## 2024-07-24 PROCEDURE — 99214 OFFICE O/P EST MOD 30 MIN: CPT | Performed by: INTERNAL MEDICINE

## 2024-07-24 PROCEDURE — 1160F RVW MEDS BY RX/DR IN RCRD: CPT | Performed by: INTERNAL MEDICINE

## 2024-07-24 NOTE — PROGRESS NOTES
Subjective:  Lisa returns for an early follow-up.  She is a very pleasant elderly female with known coronary disease status post remote CABG.  She also has hypertension and hyperlipidemia.  She comes in due to some recurrent chest discomfort.  She was having some recurrent discomfort the last several weeks.  The discomfort was described as a central burning and occurred primarily after meals.  She denies any exertionally mediated chest discomfort.  She did stop her Actos and it appears that her discomfort has largely resolved.  She comes in due to the symptoms as well as the subsequent anxiety it was provoking.    She has not had any hospitalizations and denies any other new health concerns.  She remains her usual anxious self.    Objective:  General: Alert, usual nervous self.  HEENT: Unchanged.  Lungs: Clear without crackles.  Cardiac: Distant heart tones without change.  Abdomen: Nontender.  Extremities: No edema.  Skin: No rash.  Neuro: Grossly intact.    EKG: Sinus tachycardia.  Left axis deviation.  Right bundle branch block.  No acute changes.    Impression/plan:  Lisa presents with some recurrent chest discomfort.  It certainly appears to be much more likely to be GI in origin or potentially musculoskeletal.  She is a bit tachycardic today, but I think that this is anxiety driven.  She is tolerating her usual activity level well without provocation of discomfort, so I think it is very unlikely that this represents progressive coronary disease or graft failure.  I reassured her in regards to this.    I will continue her medications unchanged.  She remains compliant with her appropriate antiplatelet therapy, and her lipids have historically been in good order.    I will see her back in follow-up in 3 months, but she knows to call and for any problematic escalation of her symptomatology in which case I will sort out whether any repeat cardiovascular testing may be in order.    Patient instructions:    Continue  current medications unchanged.    Return to clinic in 3 months.    Call for any concerning change in your symptomatology.

## 2024-07-25 LAB
ATRIAL RATE: 114 BPM
P AXIS: 57 DEGREES
P OFFSET: 218 MS
P ONSET: 157 MS
PR INTERVAL: 148 MS
Q ONSET: 231 MS
QRS COUNT: 19 BEATS
QRS DURATION: 118 MS
QT INTERVAL: 362 MS
QTC CALCULATION(BAZETT): 498 MS
QTC FREDERICIA: 448 MS
R AXIS: -54 DEGREES
T AXIS: 52 DEGREES
T OFFSET: 412 MS
VENTRICULAR RATE: 114 BPM

## 2024-08-02 ENCOUNTER — APPOINTMENT (OUTPATIENT)
Dept: ENDOCRINOLOGY | Facility: CLINIC | Age: 89
End: 2024-08-02
Payer: MEDICARE

## 2024-08-27 ENCOUNTER — APPOINTMENT (OUTPATIENT)
Dept: CARDIOLOGY | Facility: HOSPITAL | Age: 89
End: 2024-08-27
Payer: MEDICARE

## 2024-09-30 DIAGNOSIS — I25.10 CORONARY ARTERY DISEASE INVOLVING NATIVE CORONARY ARTERY OF NATIVE HEART WITHOUT ANGINA PECTORIS: Primary | ICD-10-CM

## 2024-09-30 RX ORDER — METOPROLOL TARTRATE 50 MG/1
50 TABLET ORAL 2 TIMES DAILY
Qty: 180 TABLET | Refills: 3 | Status: SHIPPED | OUTPATIENT
Start: 2024-09-30 | End: 2025-09-30

## 2024-10-10 ENCOUNTER — LAB (OUTPATIENT)
Dept: LAB | Facility: LAB | Age: 89
End: 2024-10-10
Payer: MEDICARE

## 2024-10-10 DIAGNOSIS — E11.9 TYPE 2 DIABETES MELLITUS WITHOUT COMPLICATION, WITHOUT LONG-TERM CURRENT USE OF INSULIN (MULTI): ICD-10-CM

## 2024-10-10 LAB
ALBUMIN SERPL BCP-MCNC: 4.1 G/DL (ref 3.4–5)
ALP SERPL-CCNC: 139 U/L (ref 33–136)
ALT SERPL W P-5'-P-CCNC: 31 U/L (ref 7–45)
ANION GAP SERPL CALC-SCNC: 15 MMOL/L (ref 10–20)
AST SERPL W P-5'-P-CCNC: 37 U/L (ref 9–39)
BILIRUB SERPL-MCNC: 0.8 MG/DL (ref 0–1.2)
BUN SERPL-MCNC: 17 MG/DL (ref 6–23)
CALCIUM SERPL-MCNC: 9.9 MG/DL (ref 8.6–10.3)
CHLORIDE SERPL-SCNC: 104 MMOL/L (ref 98–107)
CHOLEST SERPL-MCNC: 125 MG/DL (ref 0–199)
CHOLESTEROL/HDL RATIO: 2.4
CO2 SERPL-SCNC: 25 MMOL/L (ref 21–32)
CREAT SERPL-MCNC: 1.25 MG/DL (ref 0.5–1.05)
CREAT UR-MCNC: 143.3 MG/DL (ref 20–320)
EGFRCR SERPLBLD CKD-EPI 2021: 42 ML/MIN/1.73M*2
GLUCOSE SERPL-MCNC: 195 MG/DL (ref 74–99)
HDLC SERPL-MCNC: 51.1 MG/DL
LDLC SERPL CALC-MCNC: 39 MG/DL
MICROALBUMIN UR-MCNC: 23.6 MG/L
MICROALBUMIN/CREAT UR: 16.5 UG/MG CREAT
NON HDL CHOLESTEROL: 74 MG/DL (ref 0–149)
POTASSIUM SERPL-SCNC: 4.5 MMOL/L (ref 3.5–5.3)
PROT SERPL-MCNC: 7.2 G/DL (ref 6.4–8.2)
SODIUM SERPL-SCNC: 139 MMOL/L (ref 136–145)
TRIGL SERPL-MCNC: 176 MG/DL (ref 0–149)
VLDL: 35 MG/DL (ref 0–40)

## 2024-10-10 PROCEDURE — 80061 LIPID PANEL: CPT

## 2024-10-10 PROCEDURE — 82570 ASSAY OF URINE CREATININE: CPT

## 2024-10-10 PROCEDURE — 80053 COMPREHEN METABOLIC PANEL: CPT

## 2024-10-10 PROCEDURE — 36415 COLL VENOUS BLD VENIPUNCTURE: CPT

## 2024-10-10 PROCEDURE — 82043 UR ALBUMIN QUANTITATIVE: CPT

## 2024-10-10 PROCEDURE — 83036 HEMOGLOBIN GLYCOSYLATED A1C: CPT

## 2024-10-11 LAB
EST. AVERAGE GLUCOSE BLD GHB EST-MCNC: 237 MG/DL
HBA1C MFR BLD: 9.9 %

## 2024-10-14 ASSESSMENT — ENCOUNTER SYMPTOMS
FATIGUE: 1
UNEXPECTED WEIGHT CHANGE: 1
SLEEP DISTURBANCE: 1

## 2024-10-14 NOTE — PATIENT INSTRUCTIONS
Thank you for choosing [facility]  for your health care needs.  If you have any questions, concerns or medical needs, please feel free to contact our office at [phone].    Please ensure you complete your blood work one week before the next scheduled appointment.    To obtain blood tests before the next appointment   To continue Metformin 500mg twice daily   To commence Januvia 50mg once daily   Goal: blood sugars 100-200mg/dL   Counseled that the goal A1C should be 8% or less  Counseled glycemic control is warranted to prevent microvascular complications  To follow up with PCP regarding shakes   For duplex of the leg   Counseled that the fasting triglyceride level should be less than 150  For follow up in 6 months with repeat labs

## 2024-10-14 NOTE — PROGRESS NOTES
"Subjective   Lisa Thakkar is a 89 y.o. female who presents for follow-up of Type 2 diabetes mellitus.    She feels shaky.  Her left leg feels funny.      Since her last appointment, she has stopped 2 of her medications:  Pioglitazone - caused chest pain, leg swelling   Glimeperide  - stopped due to hair loss, diarrhea and leg feeling funny.  She says that she has had less shedding in the last two days    She has been using fungal creams for under her breast     Known complications due to diabetes included CAD s/p 4v CABG and chronic kidney disease    Cardiovascular risk factors include advanced age (older than 55 for men, 65 for women), diabetes mellitus, and microalbuminuria. The patient is not on an ACE inhibitor or angiotensin II receptor blocker.  The patient has not been previously hospitalized due to diabetic ketoacidosis.     Current symptoms/problems include none. Her clinical course has been stable.     The patient is currently checking the blood glucose.      Patient is using: glucometer                Hypoglycemia frequency: Denies   Hypoglycemia awareness: N/A    Current Medication Regimen  Metformin 500mg twice daily        Review of Systems   Constitutional:  Positive for fatigue and unexpected weight change (Weight loss).   HENT:  Positive for hearing loss.    Genitourinary:         Nocturia x 1    Musculoskeletal:  Positive for back pain.   Skin:         Dry skin    Neurological:  Positive for tremors.        Memory impairment    Psychiatric/Behavioral:  Positive for sleep disturbance.    All other systems reviewed and are negative.      Objective   /84 (BP Location: Left arm, Patient Position: Sitting, BP Cuff Size: Adult)   Pulse 104   Ht 1.549 m (5' 1\")   BMI 29.72 kg/m²     Physical Exam  Vitals and nursing note reviewed.   Constitutional:       General: She is not in acute distress.     Appearance: Normal appearance. She is normal weight.   HENT:      Head: Normocephalic and atraumatic. "      Nose: Nose normal.      Mouth/Throat:      Mouth: Mucous membranes are moist.   Eyes:      Extraocular Movements: Extraocular movements intact.   Pulmonary:      Effort: Pulmonary effort is normal.   Musculoskeletal:         General: Normal range of motion.   Neurological:      Mental Status: She is alert and oriented to person, place, and time.      Comments: Diffusely tremulous    Psychiatric:         Mood and Affect: Mood normal.       Lab Review  Glucose (mg/dL)   Date Value   10/10/2024 195 (H)   04/10/2024 196 (H)   09/15/2023 168 (H)   05/03/2023 238 (H)   04/25/2023 208 (H)     Hemoglobin A1C (%)   Date Value   10/10/2024 9.9 (H)   04/10/2024 9.9 (H)   09/15/2023 9.0 (A)   03/13/2023 8.6 (A)   11/23/2022 8.9 (A)     Bicarbonate (mmol/L)   Date Value   10/10/2024 25   04/10/2024 25   09/15/2023 27   05/03/2023 26   04/25/2023 25     Urea Nitrogen (mg/dL)   Date Value   10/10/2024 17   04/10/2024 20   09/15/2023 15   05/03/2023 19   04/25/2023 22     Creatinine (mg/dL)   Date Value   10/10/2024 1.25 (H)   04/10/2024 1.19 (H)   09/15/2023 1.25 (H)   05/03/2023 1.21 (H)   04/25/2023 1.24 (H)     Lab Results   Component Value Date    CHOL 125 10/10/2024    CHOL 127 09/15/2023    CHOL 136 06/29/2022     Lab Results   Component Value Date    HDL 51.1 10/10/2024    HDL 50.3 09/15/2023    HDL 54.9 06/29/2022     Lab Results   Component Value Date    LDLCALC 39 10/10/2024     Lab Results   Component Value Date    TRIG 176 (H) 10/10/2024    TRIG 195 (H) 09/15/2023    TRIG 188 (H) 06/29/2022     Health Maintenance:   Foot Exam: September 2023  Eye Exam: September 2024  Urine Albumin: October 10, 2024    Assessment/Plan      89-year-old female presents for follow-up for type 2 diabetes. Her blood pressure is elevated above goal.    Type 2 diabetes mellitus (Multi)  To obtain blood tests before the next appointment   To continue Metformin 500mg twice daily   To commence Januvia 50mg once daily   Goal: blood sugars  100-200mg/dL   Counseled that the goal A1C should be 8% or less  Counseled glycemic control is warranted to prevent microvascular complications       Left leg swelling  To follow up with PCP regarding shakes   For duplex of the leg       Hyperlipidemia  Counseled that the fasting triglyceride level should be less than 150  To continue Simvastatin 20mg once daily     For follow up in 6 months with repeat labs

## 2024-10-15 ENCOUNTER — APPOINTMENT (OUTPATIENT)
Dept: ENDOCRINOLOGY | Facility: CLINIC | Age: 89
End: 2024-10-15
Payer: MEDICARE

## 2024-10-15 VITALS
HEART RATE: 104 BPM | BODY MASS INDEX: 29.72 KG/M2 | DIASTOLIC BLOOD PRESSURE: 84 MMHG | SYSTOLIC BLOOD PRESSURE: 156 MMHG | HEIGHT: 61 IN

## 2024-10-15 DIAGNOSIS — M79.89 LEFT LEG SWELLING: ICD-10-CM

## 2024-10-15 DIAGNOSIS — E78.1 PURE HYPERTRIGLYCERIDEMIA: ICD-10-CM

## 2024-10-15 DIAGNOSIS — E11.9 TYPE 2 DIABETES MELLITUS WITHOUT COMPLICATION, WITHOUT LONG-TERM CURRENT USE OF INSULIN (MULTI): Primary | ICD-10-CM

## 2024-10-15 PROCEDURE — 1160F RVW MEDS BY RX/DR IN RCRD: CPT | Performed by: INTERNAL MEDICINE

## 2024-10-15 PROCEDURE — 1159F MED LIST DOCD IN RCRD: CPT | Performed by: INTERNAL MEDICINE

## 2024-10-15 PROCEDURE — 99214 OFFICE O/P EST MOD 30 MIN: CPT | Performed by: INTERNAL MEDICINE

## 2024-10-15 PROCEDURE — 1123F ACP DISCUSS/DSCN MKR DOCD: CPT | Performed by: INTERNAL MEDICINE

## 2024-10-15 PROCEDURE — G2211 COMPLEX E/M VISIT ADD ON: HCPCS | Performed by: INTERNAL MEDICINE

## 2024-10-16 ASSESSMENT — ENCOUNTER SYMPTOMS
BACK PAIN: 1
TREMORS: 1
ROS SKIN COMMENTS: DRY SKIN

## 2024-10-17 ENCOUNTER — TELEPHONE (OUTPATIENT)
Dept: ENDOCRINOLOGY | Facility: CLINIC | Age: 89
End: 2024-10-17
Payer: MEDICARE

## 2024-10-17 DIAGNOSIS — E11.9 TYPE 2 DIABETES MELLITUS WITHOUT COMPLICATION, WITHOUT LONG-TERM CURRENT USE OF INSULIN (MULTI): ICD-10-CM

## 2024-10-17 PROBLEM — M79.89 LEFT LEG SWELLING: Status: ACTIVE | Noted: 2024-10-17

## 2024-10-17 NOTE — ASSESSMENT & PLAN NOTE
Counseled that the fasting triglyceride level should be less than 150  To continue Simvastatin 20mg once daily     For follow up in 6 months with repeat labs   
To follow up with PCP regarding shakes   For duplex of the leg     
To obtain blood tests before the next appointment   To continue Metformin 500mg twice daily   To commence Januvia 50mg once daily   Goal: blood sugars 100-200mg/dL   Counseled that the goal A1C should be 8% or less  Counseled glycemic control is warranted to prevent microvascular complications     
None

## 2024-10-17 NOTE — TELEPHONE ENCOUNTER
(Last appt 10/15/2024)  (Next appt 4/15/2025)    Patient state she will not take Januvia because of the side effects that she has read,  and will take metformin 500 mg twice a days,  glimepiride at night (sugars were 180 this morning

## 2024-10-21 ENCOUNTER — TELEPHONE (OUTPATIENT)
Dept: ENDOCRINOLOGY | Facility: CLINIC | Age: 89
End: 2024-10-21
Payer: MEDICARE

## 2024-10-21 NOTE — TELEPHONE ENCOUNTER
Patient notified   She state sugars are now 160-190,  she is taking the glimperide only once after dinner, because it cause Diarrhea.

## 2024-10-21 NOTE — TELEPHONE ENCOUNTER
Patient notified   She state sugars are now 160-190, she state she is taking the glimperide only once after dinner, because it cause Diarrhea.

## 2024-11-26 ENCOUNTER — OFFICE VISIT (OUTPATIENT)
Dept: CARDIOLOGY | Facility: HOSPITAL | Age: 89
End: 2024-11-26
Payer: MEDICARE

## 2024-11-26 VITALS
SYSTOLIC BLOOD PRESSURE: 145 MMHG | HEIGHT: 61 IN | BODY MASS INDEX: 29.83 KG/M2 | HEART RATE: 78 BPM | DIASTOLIC BLOOD PRESSURE: 75 MMHG | WEIGHT: 158 LBS

## 2024-11-26 DIAGNOSIS — I25.10 CORONARY ARTERY DISEASE INVOLVING NATIVE CORONARY ARTERY OF NATIVE HEART WITHOUT ANGINA PECTORIS: Primary | ICD-10-CM

## 2024-11-26 PROCEDURE — 99214 OFFICE O/P EST MOD 30 MIN: CPT | Performed by: INTERNAL MEDICINE

## 2024-11-26 PROCEDURE — 1123F ACP DISCUSS/DSCN MKR DOCD: CPT | Performed by: INTERNAL MEDICINE

## 2024-11-26 PROCEDURE — 1159F MED LIST DOCD IN RCRD: CPT | Performed by: INTERNAL MEDICINE

## 2024-11-26 PROCEDURE — 1160F RVW MEDS BY RX/DR IN RCRD: CPT | Performed by: INTERNAL MEDICINE

## 2024-11-26 PROCEDURE — 93005 ELECTROCARDIOGRAM TRACING: CPT | Performed by: INTERNAL MEDICINE

## 2024-11-26 PROCEDURE — 1036F TOBACCO NON-USER: CPT | Performed by: INTERNAL MEDICINE

## 2024-11-26 RX ORDER — METOPROLOL TARTRATE 50 MG/1
50 TABLET ORAL 2 TIMES DAILY
Qty: 180 TABLET | Refills: 3 | Status: SHIPPED | OUTPATIENT
Start: 2024-11-26 | End: 2025-11-26

## 2024-11-26 RX ORDER — SIMVASTATIN 20 MG/1
20 TABLET, FILM COATED ORAL NIGHTLY
Qty: 90 TABLET | Refills: 3 | Status: SHIPPED | OUTPATIENT
Start: 2024-11-26 | End: 2025-11-26

## 2024-11-26 NOTE — PROGRESS NOTES
Subjective:  Patient returns for a routine 4-month follow-up.  She was having some atypical chest pain symptomatology at last visit.  This fortunately has resolved completely.  She continues to struggle with her diabetic medications and is currently off several of these.  She continues to work with her PCP on adjusting these.    She remains stable from a cardiac standpoint following very remote CABG surgery.  She denies any angina or dyspnea at a reasonable activity level.  She remains compliant with her aspirin, metoprolol and simvastatin.  She overall is generally happy and comfortable with how she is doing.    She has not had any hospitalizations and denies any other new health concerns.  She did need her metoprolol and simvastatin renewed which we took care of.    Objective:  General: Alert, usual anxious self.  HEENT: Unchanged.  Lungs: Clear without crackles or wheezing.  Cardiac: Distant heart tones without change.  Abdomen: Nontender.  Extremities: No edema.  Skin: No rash.  Neuro: Grossly intact and unchanged.    EKG: Normal sinus rhythm.  Right bundle branch block.  No acute changes.    Lipid panel: Cholesterol-125, HDL-51, LDL-39, TG-176.    Impression/plan:  Lisa is doing quite well at this time following remote CABG surgery.  She is not have any concerning symptoms albeit at a somewhat low activity level given her very advanced age.  Given her clinical stability, I did not think we needed to embark on any repeat noninvasive cardiovascular testing.    Her heart rate and blood pressure remain under good control on her current metoprolol therapy.  We will continue this unchanged.    Her lipid panel also looks excellent on low-dose simvastatin so we will also continue this without change.    Given her clinical stability, we will back her visits off to every 6 months at this point.  She knows to call for any intercurrent concerns.    Patient instructions:    Continue current medications unchanged.    Return  to clinic in 6 months.

## 2024-11-27 LAB
ATRIAL RATE: 78 BPM
P AXIS: 54 DEGREES
P OFFSET: 183 MS
P ONSET: 158 MS
PR INTERVAL: 142 MS
Q ONSET: 229 MS
QRS COUNT: 13 BEATS
QRS DURATION: 120 MS
QT INTERVAL: 398 MS
QTC CALCULATION(BAZETT): 453 MS
QTC FREDERICIA: 434 MS
R AXIS: -25 DEGREES
T AXIS: -1 DEGREES
T OFFSET: 428 MS
VENTRICULAR RATE: 78 BPM

## 2024-11-28 ENCOUNTER — APPOINTMENT (OUTPATIENT)
Dept: RADIOLOGY | Facility: HOSPITAL | Age: 89
DRG: 690 | End: 2024-11-28
Payer: MEDICARE

## 2024-11-28 ENCOUNTER — HOSPITAL ENCOUNTER (INPATIENT)
Facility: HOSPITAL | Age: 89
LOS: 3 days | Discharge: HOME | DRG: 690 | End: 2024-12-01
Attending: EMERGENCY MEDICINE | Admitting: INTERNAL MEDICINE
Payer: MEDICARE

## 2024-11-28 DIAGNOSIS — E11.65 TYPE 2 DIABETES MELLITUS WITH HYPERGLYCEMIA, WITHOUT LONG-TERM CURRENT USE OF INSULIN: ICD-10-CM

## 2024-11-28 DIAGNOSIS — N12 PYELONEPHRITIS: Primary | ICD-10-CM

## 2024-11-28 PROBLEM — N13.30 HYDROURETERONEPHROSIS: Status: ACTIVE | Noted: 2024-11-28

## 2024-11-28 PROBLEM — H43.819 POSTERIOR VITREOUS DETACHMENT: Status: ACTIVE | Noted: 2024-11-28

## 2024-11-28 PROBLEM — R81 GLUCOSURIA: Status: ACTIVE | Noted: 2024-11-28

## 2024-11-28 PROBLEM — R60.0 PERIPHERAL EDEMA: Status: ACTIVE | Noted: 2024-11-28

## 2024-11-28 PROBLEM — E11.3299 NONPROLIFERATIVE DIABETIC RETINOPATHY (MULTI): Status: ACTIVE | Noted: 2023-08-14

## 2024-11-28 PROBLEM — D72.829 LEUKOCYTOSIS: Status: ACTIVE | Noted: 2024-11-28

## 2024-11-28 LAB
ALBUMIN SERPL BCP-MCNC: 4 G/DL (ref 3.4–5)
ALP SERPL-CCNC: 131 U/L (ref 33–136)
ALT SERPL W P-5'-P-CCNC: 29 U/L (ref 7–45)
ANION GAP SERPL CALC-SCNC: 15 MMOL/L (ref 10–20)
APPEARANCE UR: ABNORMAL
AST SERPL W P-5'-P-CCNC: 35 U/L (ref 9–39)
BACTERIA #/AREA URNS AUTO: ABNORMAL /HPF
BASOPHILS # BLD AUTO: 0.03 X10*3/UL (ref 0–0.1)
BASOPHILS NFR BLD AUTO: 0.2 %
BILIRUB SERPL-MCNC: 0.7 MG/DL (ref 0–1.2)
BILIRUB UR STRIP.AUTO-MCNC: NEGATIVE MG/DL
BUN SERPL-MCNC: 16 MG/DL (ref 6–23)
CALCIUM SERPL-MCNC: 9.4 MG/DL (ref 8.6–10.3)
CHLORIDE SERPL-SCNC: 103 MMOL/L (ref 98–107)
CO2 SERPL-SCNC: 23 MMOL/L (ref 21–32)
COLOR UR: YELLOW
CREAT SERPL-MCNC: 1.19 MG/DL (ref 0.5–1.05)
EGFRCR SERPLBLD CKD-EPI 2021: 44 ML/MIN/1.73M*2
EOSINOPHIL # BLD AUTO: 0.02 X10*3/UL (ref 0–0.4)
EOSINOPHIL NFR BLD AUTO: 0.2 %
ERYTHROCYTE [DISTWIDTH] IN BLOOD BY AUTOMATED COUNT: 13.6 % (ref 11.5–14.5)
GLUCOSE BLD MANUAL STRIP-MCNC: 250 MG/DL (ref 74–99)
GLUCOSE SERPL-MCNC: 249 MG/DL (ref 74–99)
GLUCOSE UR STRIP.AUTO-MCNC: ABNORMAL MG/DL
HCT VFR BLD AUTO: 40.3 % (ref 36–46)
HGB BLD-MCNC: 13.6 G/DL (ref 12–16)
IMM GRANULOCYTES # BLD AUTO: 0.05 X10*3/UL (ref 0–0.5)
IMM GRANULOCYTES NFR BLD AUTO: 0.4 % (ref 0–0.9)
KETONES UR STRIP.AUTO-MCNC: ABNORMAL MG/DL
LEUKOCYTE ESTERASE UR QL STRIP.AUTO: ABNORMAL
LIPASE SERPL-CCNC: 60 U/L (ref 9–82)
LYMPHOCYTES # BLD AUTO: 0.99 X10*3/UL (ref 0.8–3)
LYMPHOCYTES NFR BLD AUTO: 7.8 %
MCH RBC QN AUTO: 28.4 PG (ref 26–34)
MCHC RBC AUTO-ENTMCNC: 33.7 G/DL (ref 32–36)
MCV RBC AUTO: 84 FL (ref 80–100)
MONOCYTES # BLD AUTO: 0.94 X10*3/UL (ref 0.05–0.8)
MONOCYTES NFR BLD AUTO: 7.4 %
NEUTROPHILS # BLD AUTO: 10.59 X10*3/UL (ref 1.6–5.5)
NEUTROPHILS NFR BLD AUTO: 84 %
NITRITE UR QL STRIP.AUTO: ABNORMAL
NRBC BLD-RTO: 0 /100 WBCS (ref 0–0)
PH UR STRIP.AUTO: 7 [PH]
PLATELET # BLD AUTO: 266 X10*3/UL (ref 150–450)
POTASSIUM SERPL-SCNC: 4.4 MMOL/L (ref 3.5–5.3)
PROT SERPL-MCNC: 6.6 G/DL (ref 6.4–8.2)
PROT UR STRIP.AUTO-MCNC: ABNORMAL MG/DL
RBC # BLD AUTO: 4.79 X10*6/UL (ref 4–5.2)
RBC # UR STRIP.AUTO: ABNORMAL /UL
RBC #/AREA URNS AUTO: >20 /HPF
SODIUM SERPL-SCNC: 137 MMOL/L (ref 136–145)
SP GR UR STRIP.AUTO: 1.02
UROBILINOGEN UR STRIP.AUTO-MCNC: NORMAL MG/DL
WBC # BLD AUTO: 12.6 X10*3/UL (ref 4.4–11.3)
WBC #/AREA URNS AUTO: >50 /HPF

## 2024-11-28 PROCEDURE — 87086 URINE CULTURE/COLONY COUNT: CPT | Mod: AHULAB | Performed by: EMERGENCY MEDICINE

## 2024-11-28 PROCEDURE — 2500000004 HC RX 250 GENERAL PHARMACY W/ HCPCS (ALT 636 FOR OP/ED): Performed by: EMERGENCY MEDICINE

## 2024-11-28 PROCEDURE — 82947 ASSAY GLUCOSE BLOOD QUANT: CPT

## 2024-11-28 PROCEDURE — 74176 CT ABD & PELVIS W/O CONTRAST: CPT | Mod: FOREIGN READ | Performed by: RADIOLOGY

## 2024-11-28 PROCEDURE — 2500000001 HC RX 250 WO HCPCS SELF ADMINISTERED DRUGS (ALT 637 FOR MEDICARE OP): Performed by: EMERGENCY MEDICINE

## 2024-11-28 PROCEDURE — 36415 COLL VENOUS BLD VENIPUNCTURE: CPT | Performed by: EMERGENCY MEDICINE

## 2024-11-28 PROCEDURE — 2500000002 HC RX 250 W HCPCS SELF ADMINISTERED DRUGS (ALT 637 FOR MEDICARE OP, ALT 636 FOR OP/ED): Performed by: PHYSICIAN ASSISTANT

## 2024-11-28 PROCEDURE — 1100000001 HC PRIVATE ROOM DAILY

## 2024-11-28 PROCEDURE — 85025 COMPLETE CBC W/AUTO DIFF WBC: CPT | Performed by: EMERGENCY MEDICINE

## 2024-11-28 PROCEDURE — 80053 COMPREHEN METABOLIC PANEL: CPT | Performed by: EMERGENCY MEDICINE

## 2024-11-28 PROCEDURE — 2500000004 HC RX 250 GENERAL PHARMACY W/ HCPCS (ALT 636 FOR OP/ED): Performed by: PHYSICIAN ASSISTANT

## 2024-11-28 PROCEDURE — 83690 ASSAY OF LIPASE: CPT | Performed by: EMERGENCY MEDICINE

## 2024-11-28 PROCEDURE — 99223 1ST HOSP IP/OBS HIGH 75: CPT | Performed by: PHYSICIAN ASSISTANT

## 2024-11-28 PROCEDURE — 81001 URINALYSIS AUTO W/SCOPE: CPT | Performed by: EMERGENCY MEDICINE

## 2024-11-28 PROCEDURE — 2500000001 HC RX 250 WO HCPCS SELF ADMINISTERED DRUGS (ALT 637 FOR MEDICARE OP): Performed by: PHYSICIAN ASSISTANT

## 2024-11-28 PROCEDURE — 74176 CT ABD & PELVIS W/O CONTRAST: CPT

## 2024-11-28 PROCEDURE — 96365 THER/PROPH/DIAG IV INF INIT: CPT

## 2024-11-28 PROCEDURE — 99285 EMERGENCY DEPT VISIT HI MDM: CPT | Mod: 25

## 2024-11-28 RX ORDER — ONDANSETRON 4 MG/1
4 TABLET, ORALLY DISINTEGRATING ORAL EVERY 8 HOURS PRN
Status: DISCONTINUED | OUTPATIENT
Start: 2024-11-28 | End: 2024-12-01 | Stop reason: HOSPADM

## 2024-11-28 RX ORDER — DEXTROSE 50 % IN WATER (D50W) INTRAVENOUS SYRINGE
25
Status: DISCONTINUED | OUTPATIENT
Start: 2024-11-28 | End: 2024-12-01 | Stop reason: HOSPADM

## 2024-11-28 RX ORDER — GLIMEPIRIDE 2 MG/1
2 TABLET ORAL 2 TIMES DAILY
Status: DISCONTINUED | OUTPATIENT
Start: 2024-11-28 | End: 2024-11-29

## 2024-11-28 RX ORDER — ACETAMINOPHEN 325 MG/1
650 TABLET ORAL EVERY 4 HOURS PRN
Status: DISCONTINUED | OUTPATIENT
Start: 2024-11-28 | End: 2024-12-01 | Stop reason: HOSPADM

## 2024-11-28 RX ORDER — DEXTROSE 50 % IN WATER (D50W) INTRAVENOUS SYRINGE
12.5
Status: DISCONTINUED | OUTPATIENT
Start: 2024-11-28 | End: 2024-12-01 | Stop reason: HOSPADM

## 2024-11-28 RX ORDER — ACETAMINOPHEN 325 MG/1
650 TABLET ORAL ONCE
Status: COMPLETED | OUTPATIENT
Start: 2024-11-28 | End: 2024-11-28

## 2024-11-28 RX ORDER — ONDANSETRON HYDROCHLORIDE 2 MG/ML
4 INJECTION, SOLUTION INTRAVENOUS ONCE
Status: DISCONTINUED | OUTPATIENT
Start: 2024-11-28 | End: 2024-11-28

## 2024-11-28 RX ORDER — CEFTRIAXONE 1 G/50ML
1 INJECTION, SOLUTION INTRAVENOUS ONCE
Status: COMPLETED | OUTPATIENT
Start: 2024-11-28 | End: 2024-11-28

## 2024-11-28 RX ORDER — CEFTRIAXONE 1 G/50ML
1 INJECTION, SOLUTION INTRAVENOUS EVERY 24 HOURS
Status: DISCONTINUED | OUTPATIENT
Start: 2024-11-29 | End: 2024-12-01 | Stop reason: HOSPADM

## 2024-11-28 RX ORDER — HEPARIN SODIUM 5000 [USP'U]/ML
5000 INJECTION, SOLUTION INTRAVENOUS; SUBCUTANEOUS EVERY 8 HOURS SCHEDULED
Status: DISCONTINUED | OUTPATIENT
Start: 2024-11-28 | End: 2024-12-01 | Stop reason: HOSPADM

## 2024-11-28 RX ORDER — SIMVASTATIN 40 MG/1
20 TABLET, FILM COATED ORAL NIGHTLY
Status: DISCONTINUED | OUTPATIENT
Start: 2024-11-28 | End: 2024-12-01 | Stop reason: HOSPADM

## 2024-11-28 RX ORDER — ACETAMINOPHEN 650 MG/1
650 SUPPOSITORY RECTAL EVERY 4 HOURS PRN
Status: DISCONTINUED | OUTPATIENT
Start: 2024-11-28 | End: 2024-12-01 | Stop reason: HOSPADM

## 2024-11-28 RX ORDER — ELECTROLYTES/DEXTROSE
5 SOLUTION, ORAL ORAL DAILY
Status: DISCONTINUED | OUTPATIENT
Start: 2024-11-29 | End: 2024-11-28

## 2024-11-28 RX ORDER — INSULIN LISPRO 100 [IU]/ML
0-5 INJECTION, SOLUTION INTRAVENOUS; SUBCUTANEOUS
Status: DISCONTINUED | OUTPATIENT
Start: 2024-11-29 | End: 2024-12-01 | Stop reason: HOSPADM

## 2024-11-28 RX ORDER — METOPROLOL TARTRATE 25 MG/1
50 TABLET, FILM COATED ORAL 2 TIMES DAILY
Status: DISCONTINUED | OUTPATIENT
Start: 2024-11-28 | End: 2024-12-01 | Stop reason: HOSPADM

## 2024-11-28 RX ORDER — CHOLECALCIFEROL (VITAMIN D3) 25 MCG
1000 TABLET ORAL DAILY
Status: DISCONTINUED | OUTPATIENT
Start: 2024-11-29 | End: 2024-12-01 | Stop reason: HOSPADM

## 2024-11-28 RX ORDER — ASPIRIN 81 MG/1
81 TABLET ORAL DAILY
Status: DISCONTINUED | OUTPATIENT
Start: 2024-11-29 | End: 2024-12-01 | Stop reason: HOSPADM

## 2024-11-28 RX ORDER — METFORMIN HYDROCHLORIDE 500 MG/1
500 TABLET ORAL
Status: DISCONTINUED | OUTPATIENT
Start: 2024-11-29 | End: 2024-12-01 | Stop reason: HOSPADM

## 2024-11-28 RX ORDER — ONDANSETRON HYDROCHLORIDE 2 MG/ML
4 INJECTION, SOLUTION INTRAVENOUS EVERY 8 HOURS PRN
Status: DISCONTINUED | OUTPATIENT
Start: 2024-11-28 | End: 2024-12-01 | Stop reason: HOSPADM

## 2024-11-28 RX ORDER — ACETAMINOPHEN 160 MG/5ML
650 SOLUTION ORAL EVERY 4 HOURS PRN
Status: DISCONTINUED | OUTPATIENT
Start: 2024-11-28 | End: 2024-12-01 | Stop reason: HOSPADM

## 2024-11-28 RX ADMIN — HEPARIN SODIUM 5000 UNITS: 5000 INJECTION, SOLUTION INTRAVENOUS; SUBCUTANEOUS at 22:51

## 2024-11-28 RX ADMIN — ONDANSETRON 4 MG: 2 INJECTION, SOLUTION INTRAMUSCULAR; INTRAVENOUS at 23:51

## 2024-11-28 RX ADMIN — CEFTRIAXONE SODIUM 1 G: 1 INJECTION, SOLUTION INTRAVENOUS at 20:44

## 2024-11-28 RX ADMIN — METOPROLOL TARTRATE 50 MG: 25 TABLET, FILM COATED ORAL at 22:50

## 2024-11-28 RX ADMIN — ACETAMINOPHEN 325MG 650 MG: 325 TABLET ORAL at 23:50

## 2024-11-28 RX ADMIN — ACETAMINOPHEN 650 MG: 325 TABLET, FILM COATED ORAL at 19:46

## 2024-11-28 RX ADMIN — SODIUM CHLORIDE, POTASSIUM CHLORIDE, SODIUM LACTATE AND CALCIUM CHLORIDE 1000 ML: 600; 310; 30; 20 INJECTION, SOLUTION INTRAVENOUS at 19:22

## 2024-11-28 RX ADMIN — SIMVASTATIN 20 MG: 40 TABLET, FILM COATED ORAL at 22:50

## 2024-11-28 ASSESSMENT — COGNITIVE AND FUNCTIONAL STATUS - GENERAL
MOBILITY SCORE: 23
DAILY ACTIVITIY SCORE: 24
CLIMB 3 TO 5 STEPS WITH RAILING: A LITTLE

## 2024-11-28 ASSESSMENT — COLUMBIA-SUICIDE SEVERITY RATING SCALE - C-SSRS
1. IN THE PAST MONTH, HAVE YOU WISHED YOU WERE DEAD OR WISHED YOU COULD GO TO SLEEP AND NOT WAKE UP?: NO
2. HAVE YOU ACTUALLY HAD ANY THOUGHTS OF KILLING YOURSELF?: NO
6. HAVE YOU EVER DONE ANYTHING, STARTED TO DO ANYTHING, OR PREPARED TO DO ANYTHING TO END YOUR LIFE?: NO

## 2024-11-28 ASSESSMENT — PAIN DESCRIPTION - PAIN TYPE: TYPE: ACUTE PAIN

## 2024-11-28 ASSESSMENT — PAIN - FUNCTIONAL ASSESSMENT
PAIN_FUNCTIONAL_ASSESSMENT: 0-10

## 2024-11-28 ASSESSMENT — PAIN DESCRIPTION - ORIENTATION
ORIENTATION: RIGHT
ORIENTATION: RIGHT

## 2024-11-28 ASSESSMENT — PAIN SCALES - GENERAL
PAINLEVEL_OUTOF10: 7
PAINLEVEL_OUTOF10: 5 - MODERATE PAIN
PAINLEVEL_OUTOF10: 7
PAINLEVEL_OUTOF10: 10 - WORST POSSIBLE PAIN
PAINLEVEL_OUTOF10: 1

## 2024-11-28 ASSESSMENT — PAIN DESCRIPTION - LOCATION
LOCATION: ABDOMEN
LOCATION: ABDOMEN

## 2024-11-29 LAB
ANION GAP SERPL CALC-SCNC: 13 MMOL/L (ref 10–20)
BUN SERPL-MCNC: 13 MG/DL (ref 6–23)
CALCIUM SERPL-MCNC: 8.4 MG/DL (ref 8.6–10.3)
CHLORIDE SERPL-SCNC: 103 MMOL/L (ref 98–107)
CO2 SERPL-SCNC: 22 MMOL/L (ref 21–32)
CREAT SERPL-MCNC: 1.14 MG/DL (ref 0.5–1.05)
EGFRCR SERPLBLD CKD-EPI 2021: 46 ML/MIN/1.73M*2
ERYTHROCYTE [DISTWIDTH] IN BLOOD BY AUTOMATED COUNT: 13.8 % (ref 11.5–14.5)
GLUCOSE BLD MANUAL STRIP-MCNC: 244 MG/DL (ref 74–99)
GLUCOSE BLD MANUAL STRIP-MCNC: 250 MG/DL (ref 74–99)
GLUCOSE BLD MANUAL STRIP-MCNC: 254 MG/DL (ref 74–99)
GLUCOSE BLD MANUAL STRIP-MCNC: 268 MG/DL (ref 74–99)
GLUCOSE SERPL-MCNC: 295 MG/DL (ref 74–99)
HCT VFR BLD AUTO: 39.5 % (ref 36–46)
HGB BLD-MCNC: 12.9 G/DL (ref 12–16)
MCH RBC QN AUTO: 28 PG (ref 26–34)
MCHC RBC AUTO-ENTMCNC: 32.7 G/DL (ref 32–36)
MCV RBC AUTO: 86 FL (ref 80–100)
NRBC BLD-RTO: 0 /100 WBCS (ref 0–0)
PLATELET # BLD AUTO: 235 X10*3/UL (ref 150–450)
POTASSIUM SERPL-SCNC: 4.1 MMOL/L (ref 3.5–5.3)
RBC # BLD AUTO: 4.6 X10*6/UL (ref 4–5.2)
SODIUM SERPL-SCNC: 134 MMOL/L (ref 136–145)
WBC # BLD AUTO: 12.9 X10*3/UL (ref 4.4–11.3)

## 2024-11-29 PROCEDURE — 1100000001 HC PRIVATE ROOM DAILY

## 2024-11-29 PROCEDURE — 99222 1ST HOSP IP/OBS MODERATE 55: CPT | Performed by: INTERNAL MEDICINE

## 2024-11-29 PROCEDURE — 36415 COLL VENOUS BLD VENIPUNCTURE: CPT | Performed by: PHYSICIAN ASSISTANT

## 2024-11-29 PROCEDURE — 2500000001 HC RX 250 WO HCPCS SELF ADMINISTERED DRUGS (ALT 637 FOR MEDICARE OP): Performed by: PHYSICIAN ASSISTANT

## 2024-11-29 PROCEDURE — 2500000001 HC RX 250 WO HCPCS SELF ADMINISTERED DRUGS (ALT 637 FOR MEDICARE OP): Performed by: INTERNAL MEDICINE

## 2024-11-29 PROCEDURE — 2500000002 HC RX 250 W HCPCS SELF ADMINISTERED DRUGS (ALT 637 FOR MEDICARE OP, ALT 636 FOR OP/ED): Performed by: PHYSICIAN ASSISTANT

## 2024-11-29 PROCEDURE — 2500000004 HC RX 250 GENERAL PHARMACY W/ HCPCS (ALT 636 FOR OP/ED): Performed by: PHYSICIAN ASSISTANT

## 2024-11-29 PROCEDURE — 80048 BASIC METABOLIC PNL TOTAL CA: CPT | Performed by: PHYSICIAN ASSISTANT

## 2024-11-29 PROCEDURE — 99232 SBSQ HOSP IP/OBS MODERATE 35: CPT | Performed by: INTERNAL MEDICINE

## 2024-11-29 PROCEDURE — 85027 COMPLETE CBC AUTOMATED: CPT | Performed by: PHYSICIAN ASSISTANT

## 2024-11-29 PROCEDURE — 82947 ASSAY GLUCOSE BLOOD QUANT: CPT

## 2024-11-29 RX ORDER — TRAMADOL HYDROCHLORIDE 50 MG/1
50 TABLET ORAL EVERY 12 HOURS PRN
Status: DISCONTINUED | OUTPATIENT
Start: 2024-11-29 | End: 2024-12-01 | Stop reason: HOSPADM

## 2024-11-29 RX ADMIN — METOPROLOL TARTRATE 50 MG: 25 TABLET, FILM COATED ORAL at 20:05

## 2024-11-29 RX ADMIN — INSULIN LISPRO 3 UNITS: 100 INJECTION, SOLUTION INTRAVENOUS; SUBCUTANEOUS at 08:22

## 2024-11-29 RX ADMIN — HEPARIN SODIUM 5000 UNITS: 5000 INJECTION, SOLUTION INTRAVENOUS; SUBCUTANEOUS at 06:57

## 2024-11-29 RX ADMIN — METOPROLOL TARTRATE 50 MG: 25 TABLET, FILM COATED ORAL at 08:22

## 2024-11-29 RX ADMIN — ASPIRIN 81 MG: 81 TABLET, COATED ORAL at 08:22

## 2024-11-29 RX ADMIN — CEFTRIAXONE SODIUM 1 G: 1 INJECTION, SOLUTION INTRAVENOUS at 20:17

## 2024-11-29 RX ADMIN — HEPARIN SODIUM 5000 UNITS: 5000 INJECTION, SOLUTION INTRAVENOUS; SUBCUTANEOUS at 15:00

## 2024-11-29 RX ADMIN — TRAMADOL HYDROCHLORIDE 50 MG: 50 TABLET, COATED ORAL at 04:11

## 2024-11-29 RX ADMIN — HEPARIN SODIUM 5000 UNITS: 5000 INJECTION, SOLUTION INTRAVENOUS; SUBCUTANEOUS at 21:22

## 2024-11-29 RX ADMIN — Medication 1000 UNITS: at 08:22

## 2024-11-29 RX ADMIN — ACETAMINOPHEN 325MG 650 MG: 325 TABLET ORAL at 04:11

## 2024-11-29 RX ADMIN — INSULIN LISPRO 3 UNITS: 100 INJECTION, SOLUTION INTRAVENOUS; SUBCUTANEOUS at 17:04

## 2024-11-29 RX ADMIN — SIMVASTATIN 20 MG: 40 TABLET, FILM COATED ORAL at 20:05

## 2024-11-29 RX ADMIN — INSULIN LISPRO 2 UNITS: 100 INJECTION, SOLUTION INTRAVENOUS; SUBCUTANEOUS at 12:15

## 2024-11-29 SDOH — SOCIAL STABILITY: SOCIAL INSECURITY: ABUSE: ADULT

## 2024-11-29 SDOH — SOCIAL STABILITY: SOCIAL INSECURITY: ARE THERE ANY APPARENT SIGNS OF INJURIES/BEHAVIORS THAT COULD BE RELATED TO ABUSE/NEGLECT?: NO

## 2024-11-29 SDOH — SOCIAL STABILITY: SOCIAL INSECURITY: DOES ANYONE TRY TO KEEP YOU FROM HAVING/CONTACTING OTHER FRIENDS OR DOING THINGS OUTSIDE YOUR HOME?: NO

## 2024-11-29 SDOH — SOCIAL STABILITY: SOCIAL INSECURITY
WITHIN THE LAST YEAR, HAVE YOU BEEN KICKED, HIT, SLAPPED, OR OTHERWISE PHYSICALLY HURT BY YOUR PARTNER OR EX-PARTNER?: NO

## 2024-11-29 SDOH — ECONOMIC STABILITY: HOUSING INSECURITY: AT ANY TIME IN THE PAST 12 MONTHS, WERE YOU HOMELESS OR LIVING IN A SHELTER (INCLUDING NOW)?: NO

## 2024-11-29 SDOH — ECONOMIC STABILITY: HOUSING INSECURITY: IN THE LAST 12 MONTHS, WAS THERE A TIME WHEN YOU WERE NOT ABLE TO PAY THE MORTGAGE OR RENT ON TIME?: NO

## 2024-11-29 SDOH — SOCIAL STABILITY: SOCIAL INSECURITY: HAS ANYONE EVER THREATENED TO HURT YOUR FAMILY OR YOUR PETS?: NO

## 2024-11-29 SDOH — SOCIAL STABILITY: SOCIAL INSECURITY: ARE YOU OR HAVE YOU BEEN THREATENED OR ABUSED PHYSICALLY, EMOTIONALLY, OR SEXUALLY BY ANYONE?: NO

## 2024-11-29 SDOH — SOCIAL STABILITY: SOCIAL INSECURITY: HAVE YOU HAD THOUGHTS OF HARMING ANYONE ELSE?: NO

## 2024-11-29 SDOH — ECONOMIC STABILITY: FOOD INSECURITY: WITHIN THE PAST 12 MONTHS, THE FOOD YOU BOUGHT JUST DIDN'T LAST AND YOU DIDN'T HAVE MONEY TO GET MORE.: NEVER TRUE

## 2024-11-29 SDOH — ECONOMIC STABILITY: FOOD INSECURITY: HOW HARD IS IT FOR YOU TO PAY FOR THE VERY BASICS LIKE FOOD, HOUSING, MEDICAL CARE, AND HEATING?: NOT VERY HARD

## 2024-11-29 SDOH — HEALTH STABILITY: MENTAL HEALTH
DO YOU FEEL STRESS - TENSE, RESTLESS, NERVOUS, OR ANXIOUS, OR UNABLE TO SLEEP AT NIGHT BECAUSE YOUR MIND IS TROUBLED ALL THE TIME - THESE DAYS?: ONLY A LITTLE

## 2024-11-29 SDOH — SOCIAL STABILITY: SOCIAL INSECURITY
WITHIN THE LAST YEAR, HAVE YOU BEEN RAPED OR FORCED TO HAVE ANY KIND OF SEXUAL ACTIVITY BY YOUR PARTNER OR EX-PARTNER?: NO

## 2024-11-29 SDOH — ECONOMIC STABILITY: FOOD INSECURITY: WITHIN THE PAST 12 MONTHS, YOU WORRIED THAT YOUR FOOD WOULD RUN OUT BEFORE YOU GOT THE MONEY TO BUY MORE.: NEVER TRUE

## 2024-11-29 SDOH — SOCIAL STABILITY: SOCIAL INSECURITY: HAVE YOU HAD ANY THOUGHTS OF HARMING ANYONE ELSE?: NO

## 2024-11-29 SDOH — ECONOMIC STABILITY: INCOME INSECURITY: IN THE PAST 12 MONTHS HAS THE ELECTRIC, GAS, OIL, OR WATER COMPANY THREATENED TO SHUT OFF SERVICES IN YOUR HOME?: NO

## 2024-11-29 SDOH — SOCIAL STABILITY: SOCIAL INSECURITY: DO YOU FEEL ANYONE HAS EXPLOITED OR TAKEN ADVANTAGE OF YOU FINANCIALLY OR OF YOUR PERSONAL PROPERTY?: NO

## 2024-11-29 SDOH — SOCIAL STABILITY: SOCIAL INSECURITY: WITHIN THE LAST YEAR, HAVE YOU BEEN HUMILIATED OR EMOTIONALLY ABUSED IN OTHER WAYS BY YOUR PARTNER OR EX-PARTNER?: NO

## 2024-11-29 SDOH — ECONOMIC STABILITY: TRANSPORTATION INSECURITY: IN THE PAST 12 MONTHS, HAS LACK OF TRANSPORTATION KEPT YOU FROM MEDICAL APPOINTMENTS OR FROM GETTING MEDICATIONS?: NO

## 2024-11-29 SDOH — SOCIAL STABILITY: SOCIAL INSECURITY: WITHIN THE LAST YEAR, HAVE YOU BEEN AFRAID OF YOUR PARTNER OR EX-PARTNER?: NO

## 2024-11-29 SDOH — SOCIAL STABILITY: SOCIAL INSECURITY: WERE YOU ABLE TO COMPLETE ALL THE BEHAVIORAL HEALTH SCREENINGS?: YES

## 2024-11-29 SDOH — SOCIAL STABILITY: SOCIAL INSECURITY: DO YOU FEEL UNSAFE GOING BACK TO THE PLACE WHERE YOU ARE LIVING?: NO

## 2024-11-29 SDOH — ECONOMIC STABILITY: HOUSING INSECURITY: IN THE PAST 12 MONTHS, HOW MANY TIMES HAVE YOU MOVED WHERE YOU WERE LIVING?: 1

## 2024-11-29 ASSESSMENT — COGNITIVE AND FUNCTIONAL STATUS - GENERAL
CLIMB 3 TO 5 STEPS WITH RAILING: A LITTLE
DAILY ACTIVITIY SCORE: 24
DAILY ACTIVITIY SCORE: 24
CLIMB 3 TO 5 STEPS WITH RAILING: A LITTLE
MOBILITY SCORE: 23
DAILY ACTIVITIY SCORE: 24
CLIMB 3 TO 5 STEPS WITH RAILING: A LITTLE
MOBILITY SCORE: 23
CLIMB 3 TO 5 STEPS WITH RAILING: A LITTLE
MOBILITY SCORE: 23
DAILY ACTIVITIY SCORE: 24
PATIENT BASELINE BEDBOUND: NO
CLIMB 3 TO 5 STEPS WITH RAILING: A LITTLE

## 2024-11-29 ASSESSMENT — LIFESTYLE VARIABLES
HOW OFTEN DO YOU HAVE A DRINK CONTAINING ALCOHOL: NEVER
HOW OFTEN DO YOU HAVE 6 OR MORE DRINKS ON ONE OCCASION: NEVER
SUBSTANCE_ABUSE_PAST_12_MONTHS: NO
PRESCIPTION_ABUSE_PAST_12_MONTHS: NO
HOW MANY STANDARD DRINKS CONTAINING ALCOHOL DO YOU HAVE ON A TYPICAL DAY: PATIENT DOES NOT DRINK
AUDIT-C TOTAL SCORE: 0
SKIP TO QUESTIONS 9-10: 1
AUDIT-C TOTAL SCORE: 0

## 2024-11-29 ASSESSMENT — ACTIVITIES OF DAILY LIVING (ADL)
LACK_OF_TRANSPORTATION: NO
JUDGMENT_ADEQUATE_SAFELY_COMPLETE_DAILY_ACTIVITIES: YES
PATIENT'S MEMORY ADEQUATE TO SAFELY COMPLETE DAILY ACTIVITIES?: YES
HEARING - LEFT EAR: FUNCTIONAL
TOILETING: INDEPENDENT
WALKS IN HOME: INDEPENDENT
GROOMING: INDEPENDENT
BATHING: INDEPENDENT
LACK_OF_TRANSPORTATION: NO
LACK_OF_TRANSPORTATION: NO
DRESSING YOURSELF: INDEPENDENT
FEEDING YOURSELF: INDEPENDENT
ADEQUATE_TO_COMPLETE_ADL: YES
HEARING - RIGHT EAR: DIFFICULTY WITH NOISE

## 2024-11-29 ASSESSMENT — PAIN - FUNCTIONAL ASSESSMENT
PAIN_FUNCTIONAL_ASSESSMENT: 0-10

## 2024-11-29 ASSESSMENT — PAIN DESCRIPTION - LOCATION: LOCATION: ABDOMEN

## 2024-11-29 ASSESSMENT — PATIENT HEALTH QUESTIONNAIRE - PHQ9
1. LITTLE INTEREST OR PLEASURE IN DOING THINGS: NOT AT ALL
SUM OF ALL RESPONSES TO PHQ9 QUESTIONS 1 & 2: 0
2. FEELING DOWN, DEPRESSED OR HOPELESS: NOT AT ALL

## 2024-11-29 ASSESSMENT — PAIN DESCRIPTION - ORIENTATION: ORIENTATION: RIGHT

## 2024-11-29 ASSESSMENT — PAIN SCALES - GENERAL
PAINLEVEL_OUTOF10: 0 - NO PAIN
PAINLEVEL_OUTOF10: 0 - NO PAIN
PAINLEVEL_OUTOF10: 4
PAINLEVEL_OUTOF10: 8
PAINLEVEL_OUTOF10: 7

## 2024-11-29 ASSESSMENT — ENCOUNTER SYMPTOMS
ENDOCRINE COMMENTS: AS ABOVE
HEADACHES: 0
ABDOMINAL PAIN: 1
SHORTNESS OF BREATH: 0
FLANK PAIN: 1
UNEXPECTED WEIGHT CHANGE: 0

## 2024-11-29 NOTE — ED TRIAGE NOTES
Lisa Thakkar is a 89 y.o. female with complaint of right flank pain reports history of kidney stones

## 2024-11-29 NOTE — CONSULTS
Inpatient consult to Endocrinology  Consult performed by: Eliu Dunham MD  Consult ordered by: Radha Nelson PA-C      Reason For Consult  Diabetes    History Of Present Illness  Lisa Thakkar is a 89 y.o. female admitted yesterday with pyelonephritis.     Duration of type 2 diabetes mellitus:  undetermined, possibly 20 years  Complications:  cardiovascular disease  CKD    Home regimen:  Metformin 500 mg BID    She did not fill her prescription for Sitagliptin 50 mg/day after reading all the possible side effects.     Recent discontinuations:  Pioglitazone - caused chest pain, leg swelling   Glimepiride  - stopped due to hair loss as of last outpatient appointment.  She had restarted it and now blames if for diarrhea.     Patient does not want to go on insulin.    Patient follows with Dr. Bruce Treviño.      Latest Reference Range & Units 10/10/24 11:15   Hemoglobin A1C See comment % 9.9 (H)       Medications    Current Facility-Administered Medications:     acetaminophen (Tylenol) tablet 650 mg, 650 mg, oral, q4h PRN, 650 mg at 11/29/24 0411 **OR** acetaminophen (Tylenol) oral liquid 650 mg, 650 mg, oral, q4h PRN **OR** acetaminophen (Tylenol) suppository 650 mg, 650 mg, rectal, q4h PRN, Radha Nelson PA-C    aspirin EC tablet 81 mg, 81 mg, oral, Daily, Radha Nelson PA-C    cefTRIAXone (Rocephin) 1 g in dextrose (iso) IV 50 mL, 1 g, intravenous, q24h, Radha Nelson PA-C    cholecalciferol (Vitamin D-3) tablet 1,000 Units, 1,000 Units, oral, Daily, Radha Nelson PA-C    dextrose 50 % injection 12.5 g, 12.5 g, intravenous, q15 min PRN, Radha Nelson PA-C    dextrose 50 % injection 25 g, 25 g, intravenous, q15 min PRN, Radha Nelson PA-C    [Held by provider] glimepiride (Amaryl) tablet 2 mg, 2 mg, oral, BID, Radha Nelson PA-C    glucagon (Glucagen) injection 1 mg, 1 mg, intramuscular, q15 min PRN, Radha Nelson PA-C    glucagon (Glucagen) injection 1 mg, 1 mg, intramuscular, q15  min PRN, Radha Nelson PA-C    heparin (porcine) injection 5,000 Units, 5,000 Units, subcutaneous, q8h ZACARIAS, Radha Nelson PA-C, 5,000 Units at 11/28/24 2251    insulin lispro injection 0-5 Units, 0-5 Units, subcutaneous, TID AC, Radha Nelson PA-C    [Held by provider] metFORMIN (Glucophage) tablet 500 mg, 500 mg, oral, BID, Radha Nelson PA-C    metoprolol tartrate (Lopressor) tablet 50 mg, 50 mg, oral, BID, Radha Nelson PA-C, 50 mg at 11/28/24 2250    ondansetron ODT (Zofran-ODT) disintegrating tablet 4 mg, 4 mg, oral, q8h PRN **OR** ondansetron (Zofran) injection 4 mg, 4 mg, intravenous, q8h PRN, Radha Nelson PA-C, 4 mg at 11/28/24 2351    simvastatin (Zocor) tablet 20 mg, 20 mg, oral, Nightly, Radha Nelson PA-C, 20 mg at 11/28/24 2250    traMADol (Ultram) tablet 50 mg, 50 mg, oral, q12h PRN, Erwin Sargent DO, 50 mg at 11/29/24 0411     Past Medical History  She has a past medical history of Anaphylactic reaction due to fruits and vegetables, initial encounter, Encounter for gynecological examination (general) (routine) without abnormal findings (06/06/2022), Other conditions influencing health status (05/21/2014), Other conditions influencing health status (08/28/2014), Personal history of diseases of the blood and blood-forming organs and certain disorders involving the immune mechanism (04/20/2016), Personal history of diseases of the blood and blood-forming organs and certain disorders involving the immune mechanism, Personal history of diseases of the skin and subcutaneous tissue, Personal history of malignant neoplasm, unspecified, Personal history of other endocrine, nutritional and metabolic disease, Personal history of other endocrine, nutritional and metabolic disease, Personal history of urinary calculi, Pruritus, unspecified (09/25/2015), Unspecified cataract, Unspecified cataract, and Vitreous degeneration, left eye.    Surgical History  She has a past surgical history that  "includes Breast surgery (01/19/2017); Cardiac surgery (01/19/2017); Hand surgery (01/19/2017); Cataract extraction (07/20/2017); Coronary artery bypass graft (02/10/2017); Hysterectomy (10/30/2014); and Tonsillectomy (10/30/2014).     Social History  She reports that she has never smoked. She has never used smokeless tobacco. She reports that she does not drink alcohol and does not use drugs.    Family History  Family History   Problem Relation Name Age of Onset    Other (cardiac disorder) Mother      Dementia Mother      Ulcers Mother      Osteoporosis Mother      Colon cancer Father      Ulcers Father      Breast cancer Mother's Sister         Allergies  Pioglitazone, Amoxicillin, Axid [nizatidine], Bactrim [sulfamethoxazole-trimethoprim], Celebrex [celecoxib], Ciprofloxacin, Clindamycin, Cortisone, Erythromycin, Hydrocortisone, Lansoprazole, Methylprednisolone, Niacin, Prevacid naprapac [lansoprazole-naproxen], Protonix [pantoprazole], Robaxin [methocarbamol], Sulfa (sulfonamide antibiotics), and Welchol [colesevelam]    Review of Systems   Constitutional:  Negative for unexpected weight change.   Eyes:  Negative for visual disturbance.   Respiratory:  Negative for shortness of breath.    Cardiovascular:  Negative for chest pain.   Gastrointestinal:  Positive for abdominal pain.   Endocrine:        As above   Genitourinary:  Positive for flank pain.   Neurological:  Negative for headaches.         Last Recorded Vitals  Blood pressure 146/66, pulse 81, temperature 37.7 °C (99.8 °F), temperature source Oral, resp. rate 19, height 1.549 m (5' 1\"), weight 72.1 kg (158 lb 14.4 oz), SpO2 94%.    Physical Exam  Constitutional:       General: She is not in acute distress.  HENT:      Head: Normocephalic.      Mouth/Throat:      Mouth: Mucous membranes are moist.   Eyes:      Extraocular Movements: Extraocular movements intact.   Neck:      Thyroid: No thyromegaly.   Cardiovascular:      Pulses:           Radial pulses " are 2+ on the right side and 2+ on the left side.        Dorsalis pedis pulses are 2+ on the right side and 2+ on the left side.   Abdominal:      Tenderness: There is no abdominal tenderness.   Musculoskeletal:      Right lower leg: No edema.      Left lower leg: No edema.   Neurological:      Mental Status: She is alert.      Motor: No tremor.   Psychiatric:         Mood and Affect: Affect normal.          Relevant Results   Latest Reference Range & Units 11/28/24 19:18 11/29/24 05:04   GLUCOSE 74 - 99 mg/dL 249 (H) 295 (H)   SODIUM 136 - 145 mmol/L 137 134 (L)   POTASSIUM 3.5 - 5.3 mmol/L 4.4 4.1   CHLORIDE 98 - 107 mmol/L 103 103   Bicarbonate 21 - 32 mmol/L 23 22   Anion Gap 10 - 20 mmol/L 15 13   Blood Urea Nitrogen 6 - 23 mg/dL 16 13   Creatinine 0.50 - 1.05 mg/dL 1.19 (H) 1.14 (H)   EGFR >60 mL/min/1.73m*2 44 (L) 46 (L)   Calcium 8.6 - 10.3 mg/dL 9.4 8.4 (L)       IMPRESSION  TYPE 2 DIABETES MELLITUS WITH HYPERGLYCEMIA  Longstanding diabetes with hyperglycemia  Stated side effects from pioglitazone and glimepiride  Tolerates metformin  Tight glucose control not indicated due to age and comorbidities.    RECOMMENDATIONS  Continue insulin corrective scale in the hospital  Discussed possible requirement for insulin at discharge if she remains hyperglycemic and cannot use other options.    She does not want to inject insulin.  Advised side effects, and glucose effects, of sitagliptin are minimal.  Advised the package insert has to list every symptom someone with diabetes could have, regardless if due to medication.   Advised option of GLP1-RA, but these are injected and actually do cause significant side effects.   I would not recommend SGLT2-inihibitor as she already has pyelonephritis  Diabetes education  Follow up with Dr. Bruce Treviño.       Eliu Dunham MD

## 2024-11-29 NOTE — H&P
History Of Present Illness  Lisa Thakkar is a 89 y.o. female presenting with pyelonephritis, mild leukocytosis, glucosuria, hematuria.  Patient with a history of chronic right hydronephrosis, type 2 diabetes, CKD, CAD, nephrolithiasis, hypertension etc presented for right flank pain that started today.  Patient had some nausea but no vomiting.  Patient states she felt constipated and had to strain with a bowel movement and then had diarrhea afterward.  Patient states the right flank pain started with the diarrhea.  She thought it might have been from straining for the bowel movement.  Her symptoms have currently all resolved and she has no current complaints.    Past medical history: Chronic right hydronephrosis, type 2 diabetes, CAD, CKD, nephrolithiasis, hypertension, CABG, osteoarthritis, osteopenia, GERD, chronic low back pain, hard of hearing, hematuria, hyperlipidemia, overactive bladder, cataracts.    Medications: Aspirin, biotin, vitamin D3, metoprolol tartrate, simvastatin, metformin.  Patient is also post to be on glimepiride but takes it infrequently because she says it causes diarrhea and she is not taking her Januvia.    Allergies: Patient has 18 allergies that were reviewed.  They include but are not limited to Bactrim, amoxicillin, Cipro, clindamycin, erythromycin etc.    Social history: Denies tobacco or alcohol use    ED course: CMP showed a glucose of 249, her creatinine is 1.19 and the GFR is 44 which is consistent with previous studies.  The rest of the CMP is within normal limits.  Lipase was 60  CBC was within normal limits other than a white count of 12.6 and a left shift.  Urinalysis showed 1+ ketones, 3+ glucose, 3+ protein, positive nitrites, leukocytosis, 3+ bacteria, greater than 20 RBCs and greater than 50 WBCs with positive nitrates.  Urine cultures pending  CT of the abdomen and pelvis without contrast showed moderate to severe right hydronephrosis and proximal to mid hydroureter with  surrounding infiltrative changes in the perinephric space suggestive of possible obstructive uropathy however no definite renal or ureteral calculi are visualized.  In the emergency department patient was treated with ceftriaxone IV and a liter of LR IV.  She also had Tylenol which she states completely relieved her pain.    The ED attending consulted urology, Laurie Colmenares, who discussed the case with Dr. Pascual.  Per the Epic chats they felt the patient should have a PVR and then just antibiotics for the UTI.  Bladder scan per the RN showed 133 cc after urination.  Urology thought she could be discharged when medically stable to follow-up with urology as an outpatient.     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    Encounter for gynecological examination (general) (routine) without abnormal findings 06/06/2022    Women's annual routine gynecological examination    Other conditions influencing health status 05/21/2014    Follow-up arranged    Other conditions influencing health status 08/28/2014    Follow-up arranged    Personal history of diseases of the blood and blood-forming organs and certain disorders involving the immune mechanism 04/20/2016    History of anemia    Personal history of diseases of the blood and blood-forming organs and certain disorders involving the immune mechanism     History of bleeding disorder    Personal history of diseases of the skin and subcutaneous tissue     History of urticaria    Personal history of malignant neoplasm, unspecified     History of malignant neoplasm    Personal history of other endocrine, nutritional and metabolic disease     History of hyperlipidemia    Personal history of other endocrine, nutritional and metabolic disease     History of diabetes mellitus    Personal history of urinary calculi     History of renal calculi    Pruritus,  unspecified 09/25/2015    Itching of ear    Unspecified cataract     Cataract, right eye    Unspecified cataract     Cataract, left    Vitreous degeneration, left eye     PVD (posterior vitreous detachment), left eye     Past Surgical History:   Procedure Laterality Date    BREAST SURGERY  01/19/2017    Breast Surgery    CARDIAC SURGERY  01/19/2017    Heart Surgery    CATARACT EXTRACTION  07/20/2017    Cataract Surgery    CORONARY ARTERY BYPASS GRAFT  02/10/2017    CABG    HAND SURGERY  01/19/2017    Hand Surgery                                                                                                                                                          HYSTERECTOMY  10/30/2014    Hysterectomy    TONSILLECTOMY  10/30/2014    Tonsillectomy     Social History     Tobacco Use    Smoking status: Never    Smokeless tobacco: Never   Vaping Use    Vaping status: Never Used   Substance Use Topics    Alcohol use: Never    Drug use: Never        Family History  Family History   Problem Relation Name Age of Onset    Other (cardiac disorder) Mother      Dementia Mother      Ulcers Mother      Osteoporosis Mother      Colon cancer Father      Ulcers Father      Breast cancer Mother's Sister          Allergies  Pioglitazone, Amoxicillin, Axid [nizatidine], Bactrim [sulfamethoxazole-trimethoprim], Celebrex [celecoxib], Ciprofloxacin, Clindamycin, Cortisone, Erythromycin, Hydrocortisone, Lansoprazole, Methylprednisolone, Niacin, Prevacid naprapac [lansoprazole-naproxen], Protonix [pantoprazole], Robaxin [methocarbamol], Sulfa (sulfonamide antibiotics), and Welchol [colesevelam]    Review of Systems  Patient denies chest pain, shortness of breath,  vomiting, fever, chills,   vision changes, rashes,  paresthesias, vertigo, headache, cough or cold symptoms, or any other complaints at this time. A complete review of systems was done, and is as stated in the history of present illness, is otherwise negative or not pertinent to  the complaint.    Physical Exam  Physical exam: Vital signs and nurses notes were reviewed.    General:  no acute distress. Alert and oriented  x 3.     Head: atraumatic and normocephalic    Eyes: Pupils equal round reactive to light, EOMs are intact, conjunctivae is not injected.    Oropharynx:  no trismus or drooling, buccal mucosa is moist.    Ears:  normal external exam, no swelling or erythema,     Nasal: normal external exam,     Neck: Supple, full range of motion,     Cardiac: Regular rate and rhythm. No murmurs noted.     Pulmonary: Lungs clear bilaterally with good aeration. No adventitious breath sounds. No wheezes rales or rhonchi. No accessory muscle use no retraction noted.    Abdomen: Soft,  Nontender. No guarding, rigidity, or distention. Normoactive bowel sounds. No pulsatile masses, no bruits.  No flank pain with palpation    Extremities:  Full range of motion.  No pitting edema    Skin: No rash seen. Skin is warm and dry     Neuro: Patient is alert and oriented x3. Speech is clear. There is no asymmetry with facial grimaces, and no tongue deviation. Patient moves all extremities independently. Sensation is intact. No obvious neuro deficits are noted.     Last Recorded Vitals  /81   Pulse 82   Temp 37.1 °C (98.7 °F) (Temporal)   Resp 18   Wt 71.7 kg (158 lb 1.1 oz)   SpO2 95%     Relevant Results  Scheduled medications  [START ON 11/29/2024] aspirin, 81 mg, oral, Daily  [START ON 11/29/2024] cefTRIAXone, 1 g, intravenous, q24h  [START ON 11/29/2024] cholecalciferol, 1,000 Units, oral, Daily  [Held by provider] glimepiride, 2 mg, oral, BID  heparin (porcine), 5,000 Units, subcutaneous, q8h [facility]  [START ON 11/29/2024] insulin lispro, 0-5 Units, subcutaneous, TID AC  [Held by provider] metFORMIN, 500 mg, oral, BID  metoprolol tartrate, 50 mg, oral, BID  simvastatin, 20 mg, oral, Nightly      Continuous medications     PRN medications  PRN medications: acetaminophen **OR** acetaminophen **OR**  acetaminophen, dextrose, dextrose, glucagon, glucagon, ondansetron ODT **OR** ondansetron    Results for orders placed or performed during the hospital encounter of 11/28/24 (from the past 24 hours)   CBC and Auto Differential   Result Value Ref Range    WBC 12.6 (H) 4.4 - 11.3 x10*3/uL    nRBC 0.0 0.0 - 0.0 /100 WBCs    RBC 4.79 4.00 - 5.20 x10*6/uL    Hemoglobin 13.6 12.0 - 16.0 g/dL    Hematocrit 40.3 36.0 - 46.0 %    MCV 84 80 - 100 fL    MCH 28.4 26.0 - 34.0 pg    MCHC 33.7 32.0 - 36.0 g/dL    RDW 13.6 11.5 - 14.5 %    Platelets 266 150 - 450 x10*3/uL    Neutrophils % 84.0 40.0 - 80.0 %    Immature Granulocytes %, Automated 0.4 0.0 - 0.9 %    Lymphocytes % 7.8 13.0 - 44.0 %    Monocytes % 7.4 2.0 - 10.0 %    Eosinophils % 0.2 0.0 - 6.0 %    Basophils % 0.2 0.0 - 2.0 %    Neutrophils Absolute 10.59 (H) 1.60 - 5.50 x10*3/uL    Immature Granulocytes Absolute, Automated 0.05 0.00 - 0.50 x10*3/uL    Lymphocytes Absolute 0.99 0.80 - 3.00 x10*3/uL    Monocytes Absolute 0.94 (H) 0.05 - 0.80 x10*3/uL    Eosinophils Absolute 0.02 0.00 - 0.40 x10*3/uL    Basophils Absolute 0.03 0.00 - 0.10 x10*3/uL   Comprehensive metabolic panel   Result Value Ref Range    Glucose 249 (H) 74 - 99 mg/dL    Sodium 137 136 - 145 mmol/L    Potassium 4.4 3.5 - 5.3 mmol/L    Chloride 103 98 - 107 mmol/L    Bicarbonate 23 21 - 32 mmol/L    Anion Gap 15 10 - 20 mmol/L    Urea Nitrogen 16 6 - 23 mg/dL    Creatinine 1.19 (H) 0.50 - 1.05 mg/dL    eGFR 44 (L) >60 mL/min/1.73m*2    Calcium 9.4 8.6 - 10.3 mg/dL    Albumin 4.0 3.4 - 5.0 g/dL    Alkaline Phosphatase 131 33 - 136 U/L    Total Protein 6.6 6.4 - 8.2 g/dL    AST 35 9 - 39 U/L    Bilirubin, Total 0.7 0.0 - 1.2 mg/dL    ALT 29 7 - 45 U/L   Lipase   Result Value Ref Range    Lipase 60 9 - 82 U/L   Urinalysis with Reflex Culture and Microscopic   Result Value Ref Range    Color, Urine Yellow Light-Yellow, Yellow, Dark-Yellow    Appearance, Urine Ex.Turbid (N) Clear    Specific Gravity, Urine  1.018 1.005 - 1.035    pH, Urine 7.0 5.0, 5.5, 6.0, 6.5, 7.0, 7.5, 8.0    Protein, Urine 300 (3+) (A) NEGATIVE, 10 (TRACE), 20 (TRACE) mg/dL    Glucose, Urine 500 (3+) (A) Normal mg/dL    Blood, Urine OVER (3+) (A) NEGATIVE    Ketones, Urine 20 (1+) (A) NEGATIVE mg/dL    Bilirubin, Urine NEGATIVE NEGATIVE    Urobilinogen, Urine Normal Normal mg/dL    Nitrite, Urine 2+ (A) NEGATIVE    Leukocyte Esterase, Urine 500 [place]/µL (A) NEGATIVE   Microscopic Only, Urine   Result Value Ref Range    WBC, Urine >50 (A) 1-5, NONE /HPF    RBC, Urine >20 (A) NONE, 1-2, 3-5 /HPF    Bacteria, Urine 3+ (A) NONE SEEN /HPF     CT abdomen pelvis wo IV contrast   Final Result   1.  There is some moderate to severe right hydronephrosis and proximal   to mid hydroureter with surrounding infiltrative changes within the   perinephric space.  These findings are suggestive of a possible   obstructive uropathy.  No definite renal or ureteral calculi are   visualized.  This could possibly represent sequelae of a previously   passed calculus.  No definite obstructing lesion is visualized.   2.  Normal caliber appendix.   3.  Colonic diverticulosis.   4.  No intra-abdominal/pelvic fluid collections or pneumoperitoneum.   5.  Coronary artery calcifications.   6.  Other findings as stated above.   Signed by Partha Cordero MD             Assessment/Plan   Assessment & Plan  Pyelonephritis    Hematuria    Leukocytosis    Glucosuria    Hydroureteronephrosis      Plan: Ceftriaxone daily  Heparin prophylactically  Sliding scale lispro  Tylenol and Zofran as needed  Endocrinology consult for poorly controlled diabetes and medical noncompliance  Diabetic educator  Daily CBC and BMP  Social work consult  Urine culture pending  Patient's home meds including aspirin, biotin, D3, Lopressor, and Zocor are ordered  Patient's Amaryl was held as she does not take it due to diarrhea and Januvia is held because she is not taking it.  Metformin also held  Sliding scale  lispro  Findings, orders, plan discussed with Dr. Arie Nelson, PA-C

## 2024-11-29 NOTE — ED PROVIDER NOTES
HPI   Chief Complaint   Patient presents with    Flank Pain       89-year-old woman history of type 2 diabetes, coronary artery disease, chronic kidney disease here complaining of right-sided flank pain.  Patient reports that her pain began this morning.  She reports that she had some excessive stooling today that she attributes to her diabetes meds.  She then developed right-sided flank pain that feels like a kidney stone which she has had in the past.  She is feeling nauseated but has not had any vomiting.  No fever or chills.  She lives alone.                   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    Encounter for gynecological examination (general) (routine) without abnormal findings 06/06/2022    Women's annual routine gynecological examination    Other conditions influencing health status 05/21/2014    Follow-up arranged    Other conditions influencing health status 08/28/2014    Follow-up arranged    Personal history of diseases of the blood and blood-forming organs and certain disorders involving the immune mechanism 04/20/2016    History of anemia    Personal history of diseases of the blood and blood-forming organs and certain disorders involving the immune mechanism     History of bleeding disorder    Personal history of diseases of the skin and subcutaneous tissue     History of urticaria    Personal history of malignant neoplasm, unspecified     History of malignant neoplasm    Personal history of other endocrine, nutritional and metabolic disease     History of hyperlipidemia    Personal history of other endocrine, nutritional and metabolic disease     History of diabetes mellitus    Personal history of urinary calculi     History of renal calculi    Pruritus, unspecified 09/25/2015    Itching of ear    Unspecified cataract     Cataract, right eye     Unspecified cataract     Cataract, left    Vitreous degeneration, left eye     PVD (posterior vitreous detachment), left eye     Past Surgical History:   Procedure Laterality Date    BREAST SURGERY  01/19/2017    Breast Surgery    CARDIAC SURGERY  01/19/2017    Heart Surgery    CATARACT EXTRACTION  07/20/2017    Cataract Surgery    CORONARY ARTERY BYPASS GRAFT  02/10/2017    CABG    HAND SURGERY  01/19/2017    Hand Surgery                                                                                                                                                          HYSTERECTOMY  10/30/2014    Hysterectomy    TONSILLECTOMY  10/30/2014    Tonsillectomy     Family History   Problem Relation Name Age of Onset    Other (cardiac disorder) Mother      Dementia Mother      Ulcers Mother      Osteoporosis Mother      Colon cancer Father      Ulcers Father      Breast cancer Mother's Sister       Social History     Tobacco Use    Smoking status: Never    Smokeless tobacco: Never   Vaping Use    Vaping status: Never Used   Substance Use Topics    Alcohol use: Never    Drug use: Never       Physical Exam   ED Triage Vitals [11/28/24 1857]   Temperature Heart Rate Respirations BP   37.1 °C (98.7 °F) 82 18 (!) 192/78      Pulse Ox Temp Source Heart Rate Source Patient Position   98 % Temporal Monitor Sitting      BP Location FiO2 (%)     Right arm --       Physical Exam  Vitals and nursing note reviewed.   Constitutional:       General: She is in acute distress.      Appearance: Normal appearance. She is not toxic-appearing.   HENT:      Head: Normocephalic.      Mouth/Throat:      Mouth: Mucous membranes are moist.   Eyes:      Conjunctiva/sclera: Conjunctivae normal.      Pupils: Pupils are equal, round, and reactive to light.   Cardiovascular:      Rate and Rhythm: Normal rate and regular rhythm.      Pulses:           Radial pulses are 2+ on the right side and 2+ on the left side.   Pulmonary:      Effort: Pulmonary  effort is normal. No respiratory distress.      Breath sounds: Normal breath sounds.   Abdominal:      General: Abdomen is flat.      Palpations: Abdomen is soft.      Tenderness: There is no abdominal tenderness. There is right CVA tenderness. There is no guarding or rebound.   Musculoskeletal:      Cervical back: Normal range of motion and neck supple.      Right lower leg: No edema.      Left lower leg: No edema.   Skin:     General: Skin is warm.   Neurological:      Mental Status: She is alert and oriented to person, place, and time.   Psychiatric:         Mood and Affect: Mood normal.           ED Course & MDM   ED Course as of 12/03/24 0905   Thu Nov 28, 2024 2046 Consult to urology- Laurie Colmenares reviewing scans.   [JG]   e Dec 03, 2024   0905 Starting IV rocephin, monitor for side effects due to multiple drug allergies, admit to hospitalist service.  Per urology, on acute intervention indicated bc pt is clinically stable.  [JG]      ED Course User Index  [JG] Rosio Kohler MD         Diagnoses as of 12/03/24 0905   Pyelonephritis                 No data recorded     Yina Coma Scale Score: 15 (12/01/24 0700 : Oksana Carmona, RN)                           Medical Decision Making  Differential includes renal colic versus constipation versus diverticulitis versus biliary colic versus gastritis.  Will obtain CT of the abdomen pelvis as well as labs and give her Dilaudid and Zofran and reassess.        Procedure  Procedures     Rosio Kohler MD  11/28/24 1911       Rosio Kohler MD  12/03/24 0905

## 2024-11-29 NOTE — PROGRESS NOTES
11/29/24 1242   Discharge Planning   Living Arrangements Alone   Support Systems Family members;Friends/neighbors   Assistance Needed none   Type of Residence Private residence   Number of Stairs to Enter Residence 0   Number of Stairs Within Residence 0   Do you have animals or pets at home? No   Who is requesting discharge planning? Provider   Home or Post Acute Services None   Expected Discharge Disposition Home   Does the patient need discharge transport arranged? No   Financial Resource Strain   How hard is it for you to pay for the very basics like food, housing, medical care, and heating? Not very   Housing Stability   In the last 12 months, was there a time when you were not able to pay the mortgage or rent on time? N   In the past 12 months, how many times have you moved where you were living? 0   At any time in the past 12 months, were you homeless or living in a shelter (including now)? N   Transportation Needs   In the past 12 months, has lack of transportation kept you from medical appointments or from getting medications? no   In the past 12 months, has lack of transportation kept you from meetings, work, or from getting things needed for daily living? No   Stroke Family Assessment   Stroke Family Assessment Needed No   Intensity of Service   Intensity of Service 0-30 min     Met with patient at the bedside to discuss discharge plan.  She plans to return home with no homecare needs at this time.  Healthy at home referral  PLAN/BARRIER: Diabetes education   DISP: home  ADOD: 1-3 days   Diamond Aguirre RN

## 2024-11-29 NOTE — PROGRESS NOTES
"Lisa Thakkar is a 89 y.o. female on day 1 of admission presenting with Pyelonephritis.    Subjective   No acute events overnight. Feeling a bit fatigued this AM. Not really wanting to go home.        Objective     VITALS  Blood pressure 126/67, pulse 73, temperature 36.6 °C (97.8 °F), temperature source Temporal, resp. rate 18, height 1.549 m (5' 1\"), weight 72.1 kg (158 lb 14.4 oz), SpO2 97%.  Physical Exam  Vitals and nursing note reviewed.   Constitutional:       General: She is not in acute distress.     Appearance: Normal appearance. She is not ill-appearing.   HENT:      Head: Normocephalic and atraumatic.   Cardiovascular:      Rate and Rhythm: Normal rate and regular rhythm.      Heart sounds: Normal heart sounds.   Pulmonary:      Effort: Pulmonary effort is normal. No respiratory distress.      Breath sounds: Normal breath sounds. No wheezing.   Abdominal:      General: Abdomen is flat. Bowel sounds are normal. There is no distension.      Palpations: Abdomen is soft.      Tenderness: There is no abdominal tenderness.   Musculoskeletal:         General: No swelling or tenderness. Normal range of motion.   Skin:     General: Skin is warm and dry.      Capillary Refill: Capillary refill takes less than 2 seconds.   Neurological:      General: No focal deficit present.      Mental Status: She is alert and oriented to person, place, and time.   Psychiatric:         Mood and Affect: Mood normal.           Intake/Output last 3 Shifts:  I/O last 3 completed shifts:  In: 0 (0 mL/kg)   Out: 900 (12.5 mL/kg) [Urine:900 (0.3 mL/kg/hr)]  Weight: 72.1 kg     Relevant Results  Results for orders placed or performed during the hospital encounter of 11/28/24 (from the past 24 hours)   CBC and Auto Differential   Result Value Ref Range    WBC 12.6 (H) 4.4 - 11.3 x10*3/uL    nRBC 0.0 0.0 - 0.0 /100 WBCs    RBC 4.79 4.00 - 5.20 x10*6/uL    Hemoglobin 13.6 12.0 - 16.0 g/dL    Hematocrit 40.3 36.0 - 46.0 %    MCV 84 80 - 100 " fL    MCH 28.4 26.0 - 34.0 pg    MCHC 33.7 32.0 - 36.0 g/dL    RDW 13.6 11.5 - 14.5 %    Platelets 266 150 - 450 x10*3/uL    Neutrophils % 84.0 40.0 - 80.0 %    Immature Granulocytes %, Automated 0.4 0.0 - 0.9 %    Lymphocytes % 7.8 13.0 - 44.0 %    Monocytes % 7.4 2.0 - 10.0 %    Eosinophils % 0.2 0.0 - 6.0 %    Basophils % 0.2 0.0 - 2.0 %    Neutrophils Absolute 10.59 (H) 1.60 - 5.50 x10*3/uL    Immature Granulocytes Absolute, Automated 0.05 0.00 - 0.50 x10*3/uL    Lymphocytes Absolute 0.99 0.80 - 3.00 x10*3/uL    Monocytes Absolute 0.94 (H) 0.05 - 0.80 x10*3/uL    Eosinophils Absolute 0.02 0.00 - 0.40 x10*3/uL    Basophils Absolute 0.03 0.00 - 0.10 x10*3/uL   Comprehensive metabolic panel   Result Value Ref Range    Glucose 249 (H) 74 - 99 mg/dL    Sodium 137 136 - 145 mmol/L    Potassium 4.4 3.5 - 5.3 mmol/L    Chloride 103 98 - 107 mmol/L    Bicarbonate 23 21 - 32 mmol/L    Anion Gap 15 10 - 20 mmol/L    Urea Nitrogen 16 6 - 23 mg/dL    Creatinine 1.19 (H) 0.50 - 1.05 mg/dL    eGFR 44 (L) >60 mL/min/1.73m*2    Calcium 9.4 8.6 - 10.3 mg/dL    Albumin 4.0 3.4 - 5.0 g/dL    Alkaline Phosphatase 131 33 - 136 U/L    Total Protein 6.6 6.4 - 8.2 g/dL    AST 35 9 - 39 U/L    Bilirubin, Total 0.7 0.0 - 1.2 mg/dL    ALT 29 7 - 45 U/L   Lipase   Result Value Ref Range    Lipase 60 9 - 82 U/L   Urinalysis with Reflex Culture and Microscopic   Result Value Ref Range    Color, Urine Yellow Light-Yellow, Yellow, Dark-Yellow    Appearance, Urine Ex.Turbid (N) Clear    Specific Gravity, Urine 1.018 1.005 - 1.035    pH, Urine 7.0 5.0, 5.5, 6.0, 6.5, 7.0, 7.5, 8.0    Protein, Urine 300 (3+) (A) NEGATIVE, 10 (TRACE), 20 (TRACE) mg/dL    Glucose, Urine 500 (3+) (A) Normal mg/dL    Blood, Urine OVER (3+) (A) NEGATIVE    Ketones, Urine 20 (1+) (A) NEGATIVE mg/dL    Bilirubin, Urine NEGATIVE NEGATIVE    Urobilinogen, Urine Normal Normal mg/dL    Nitrite, Urine 2+ (A) NEGATIVE    Leukocyte Esterase, Urine 500 Jaja/µL (A) NEGATIVE    Microscopic Only, Urine   Result Value Ref Range    WBC, Urine >50 (A) 1-5, NONE /HPF    RBC, Urine >20 (A) NONE, 1-2, 3-5 /HPF    Bacteria, Urine 3+ (A) NONE SEEN /HPF   POCT GLUCOSE   Result Value Ref Range    POCT Glucose 250 (H) 74 - 99 mg/dL   CBC   Result Value Ref Range    WBC 12.9 (H) 4.4 - 11.3 x10*3/uL    nRBC 0.0 0.0 - 0.0 /100 WBCs    RBC 4.60 4.00 - 5.20 x10*6/uL    Hemoglobin 12.9 12.0 - 16.0 g/dL    Hematocrit 39.5 36.0 - 46.0 %    MCV 86 80 - 100 fL    MCH 28.0 26.0 - 34.0 pg    MCHC 32.7 32.0 - 36.0 g/dL    RDW 13.8 11.5 - 14.5 %    Platelets 235 150 - 450 x10*3/uL   Basic metabolic panel   Result Value Ref Range    Glucose 295 (H) 74 - 99 mg/dL    Sodium 134 (L) 136 - 145 mmol/L    Potassium 4.1 3.5 - 5.3 mmol/L    Chloride 103 98 - 107 mmol/L    Bicarbonate 22 21 - 32 mmol/L    Anion Gap 13 10 - 20 mmol/L    Urea Nitrogen 13 6 - 23 mg/dL    Creatinine 1.14 (H) 0.50 - 1.05 mg/dL    eGFR 46 (L) >60 mL/min/1.73m*2    Calcium 8.4 (L) 8.6 - 10.3 mg/dL   POCT GLUCOSE   Result Value Ref Range    POCT Glucose 268 (H) 74 - 99 mg/dL   POCT GLUCOSE   Result Value Ref Range    POCT Glucose 244 (H) 74 - 99 mg/dL   POCT GLUCOSE   Result Value Ref Range    POCT Glucose 254 (H) 74 - 99 mg/dL       Imaging Results  CT abdomen pelvis wo IV contrast   Final Result   1.  There is some moderate to severe right hydronephrosis and proximal   to mid hydroureter with surrounding infiltrative changes within the   perinephric space.  These findings are suggestive of a possible   obstructive uropathy.  No definite renal or ureteral calculi are   visualized.  This could possibly represent sequelae of a previously   passed calculus.  No definite obstructing lesion is visualized.   2.  Normal caliber appendix.   3.  Colonic diverticulosis.   4.  No intra-abdominal/pelvic fluid collections or pneumoperitoneum.   5.  Coronary artery calcifications.   6.  Other findings as stated above.   Signed by Partha Cordero MD           Medications:  aspirin, 81 mg, oral, Daily  cefTRIAXone, 1 g, intravenous, q24h  cholecalciferol, 1,000 Units, oral, Daily  heparin (porcine), 5,000 Units, subcutaneous, q8h ZACARIAS  insulin lispro, 0-5 Units, subcutaneous, TID AC  [Held by provider] metFORMIN, 500 mg, oral, BID  metoprolol tartrate, 50 mg, oral, BID  simvastatin, 20 mg, oral, Nightly       PRN medications: acetaminophen **OR** acetaminophen **OR** acetaminophen, dextrose, dextrose, glucagon, glucagon, ondansetron ODT **OR** ondansetron, traMADol        Assessment/Plan          Principal Problem:    Pyelonephritis  Active Problems:    Hematuria    Leukocytosis    Glucosuria    Hydroureteronephrosis    Pyelonephritis   -Ceftriaxone daily  -Tylenol and Zofran as needed  -Urine culture pending    DM type II  -Corrective insulin   -Endocrinology consult for poorly controlled diabetes and medical noncompliance  -Diabetic educator  -Hold home oral meds     CKD stage III  -Monitor creatinine while on abx         DVT Prophylaxis:  Heparin subq    Disposition:  Monitor on IV abx await urine cx     Levi Mariscal, DO [facility]

## 2024-11-29 NOTE — CARE PLAN
The patient's goals for the shift include      The clinical goals for the shift include        Problem: Fall/Injury  Goal: Not fall by end of shift  Outcome: Progressing  Goal: Be free from injury by end of the shift  Outcome: Progressing  Goal: Verbalize understanding of personal risk factors for fall in the hospital  Outcome: Progressing  Goal: Verbalize understanding of risk factor reduction measures to prevent injury from fall in the home  Outcome: Progressing  Goal: Use assistive devices by end of the shift  Outcome: Progressing  Goal: Pace activities to prevent fatigue by end of the shift  Outcome: Progressing     Problem: Pain - Adult  Goal: Verbalizes/displays adequate comfort level or baseline comfort level  Outcome: Progressing     Problem: Safety - Adult  Goal: Free from fall injury  Outcome: Progressing     Problem: Discharge Planning  Goal: Discharge to home or other facility with appropriate resources  Outcome: Progressing     Problem: Chronic Conditions and Co-morbidities  Goal: Patient's chronic conditions and co-morbidity symptoms are monitored and maintained or improved  Outcome: Progressing

## 2024-11-29 NOTE — CONSULTS
Reason For Consult  Uncontrolled T2DM    History Of Present Illness  Lisa Thakkar is a 89 y.o. female presenting with R-sided flank pain; subsequently found to be hyperglycemic on admission.      Past Medical History  She has a past medical history of Anaphylactic reaction due to fruits and vegetables, initial encounter, Encounter for gynecological examination (general) (routine) without abnormal findings (06/06/2022), Other conditions influencing health status (05/21/2014), Other conditions influencing health status (08/28/2014), Personal history of diseases of the blood and blood-forming organs and certain disorders involving the immune mechanism (04/20/2016), Personal history of diseases of the blood and blood-forming organs and certain disorders involving the immune mechanism, Personal history of diseases of the skin and subcutaneous tissue, Personal history of malignant neoplasm, unspecified, Personal history of other endocrine, nutritional and metabolic disease, Personal history of other endocrine, nutritional and metabolic disease, Personal history of urinary calculi, Pruritus, unspecified (09/25/2015), Unspecified cataract, Unspecified cataract, and Vitreous degeneration, left eye.    Surgical History  She has a past surgical history that includes Breast surgery (01/19/2017); Cardiac surgery (01/19/2017); Hand surgery (01/19/2017); Cataract extraction (07/20/2017); Coronary artery bypass graft (02/10/2017); Hysterectomy (10/30/2014); and Tonsillectomy (10/30/2014).     Social History  She reports that she has never smoked. She has never used smokeless tobacco. She reports that she does not drink alcohol and does not use drugs.    Family History  Family History   Problem Relation Name Age of Onset    Other (cardiac disorder) Mother      Dementia Mother      Ulcers Mother      Osteoporosis Mother      Colon cancer Father      Ulcers Father      Breast cancer Mother's Sister          Allergies  Pioglitazone,  "Amoxicillin, Axid [nizatidine], Bactrim [sulfamethoxazole-trimethoprim], Celebrex [celecoxib], Ciprofloxacin, Clindamycin, Cortisone, Erythromycin, Hydrocortisone, Lansoprazole, Methylprednisolone, Niacin, Prevacid naprapac [lansoprazole-naproxen], Protonix [pantoprazole], Robaxin [methocarbamol], Sulfa (sulfonamide antibiotics), and Welchol [colesevelam]    Review of Systems  N/a       Physical Exam  N/A     Last Recorded Vitals  Blood pressure 118/67, pulse 90, temperature 36.6 °C (97.8 °F), temperature source Temporal, resp. rate 18, height 1.549 m (5' 1\"), weight 72.1 kg (158 lb 14.4 oz), SpO2 95%.    Relevant Results  HbA1c 9.9%  Last      Assessment/Plan   Briefly, Lisa Thakkar is a 89 y.o. female presenting with pyelonephritis, mild leukocytosis, glucosuria, hematuria.  Patient with a history of chronic right hydronephrosis, type 2 diabetes, CKD, CAD, nephrolithiasis, hypertension etc presented for right flank pain that started yesterday. DM education is consulted for elevated HbA1c and hyperglycemia. The following topics were discussed:    DM/Hx  -poor historian of DM dx length  -lives alone, had a fall within last year   -has walker at home, doesn't use  -discussed need to lower A1C and average BS  -states BS is high because she hasn't been sleeping well and having toileting issues      Glucose Monitoring  -discussed A1C of 9.9% = >200mg/dl daily   -pt surprised by this saying she is never over 200  -pt only checks fasting BS with glucometer in AM   -encouraged to check other times to get more accurate view of daily BS   -likely BS is higher during the day and after meals  -would be interested in CGM   -per [facility] pharmacy; insulin required for CGM     -will discuss with pt    Insulin/ Medications  -has multitude of medication allergies  -hasn't filled script for Sitagliptin 50 mg/day 2/2 fear of adverse effects   -discontinued other DM meds 2/2 adverse effects  -does not want insulin  -on " metformin      Diet/Exercise  -states she does exercises in bed  -does not leave house much   -2/2 urinary and stool frequency/incontinence  -states difficult cooking for single person   -Reviewed 50/25/25 plate method   -encouraged more vegetables and protein  -pt endorses a moderate amount of carbs in form of fruit and starchy vegetables   -discussed appropriate choices, substitutions and modifications.      Con Correa, RN  Time spent on consult and documentation: 60 minutes

## 2024-11-29 NOTE — CARE PLAN
The patient's goals for the shift include      The clinical goals for the shift include Pain control      Problem: Fall/Injury  Goal: Not fall by end of shift  Outcome: Progressing  Goal: Be free from injury by end of the shift  Outcome: Progressing  Goal: Verbalize understanding of personal risk factors for fall in the hospital  Outcome: Progressing  Goal: Verbalize understanding of risk factor reduction measures to prevent injury from fall in the home  Outcome: Progressing  Goal: Use assistive devices by end of the shift  Outcome: Progressing  Goal: Pace activities to prevent fatigue by end of the shift  Outcome: Progressing     Problem: Pain - Adult  Goal: Verbalizes/displays adequate comfort level or baseline comfort level  Outcome: Progressing     Problem: Safety - Adult  Goal: Free from fall injury  Outcome: Progressing     Problem: Discharge Planning  Goal: Discharge to home or other facility with appropriate resources  Outcome: Progressing     Problem: Chronic Conditions and Co-morbidities  Goal: Patient's chronic conditions and co-morbidity symptoms are monitored and maintained or improved  Outcome: Progressing     Problem: Pain  Goal: Takes deep breaths with improved pain control throughout the shift  Outcome: Progressing  Goal: Turns in bed with improved pain control throughout the shift  Outcome: Progressing  Goal: Walks with improved pain control throughout the shift  Outcome: Progressing  Goal: Performs ADL's with improved pain control throughout shift  Outcome: Progressing  Goal: Participates in PT with improved pain control throughout the shift  Outcome: Progressing  Goal: Free from opioid side effects throughout the shift  Outcome: Progressing  Goal: Free from acute confusion related to pain meds throughout the shift  Outcome: Progressing     Problem: Diabetes  Goal: Achieve decreasing blood glucose levels by end of shift  Outcome: Progressing  Goal: Increase stability of blood glucose readings by  end of shift  Outcome: Progressing  Goal: Decrease in ketones present in urine by end of shift  Outcome: Progressing  Goal: Maintain electrolyte levels within acceptable range throughout shift  Outcome: Progressing  Goal: Maintain glucose levels >70mg/dl to <250mg/dl throughout shift  Outcome: Progressing  Goal: No changes in neurological exam by end of shift  Outcome: Progressing  Goal: Learn about and adhere to nutrition recommendations by end of shift  Outcome: Progressing  Goal: Vital signs within normal range for age by end of shift  Outcome: Progressing  Goal: Increase self care and/or family involovement by end of shift  Outcome: Progressing  Goal: Receive DSME education by end of shift  Outcome: Progressing

## 2024-11-30 ENCOUNTER — PHARMACY VISIT (OUTPATIENT)
Dept: PHARMACY | Facility: CLINIC | Age: 89
End: 2024-11-30
Payer: MEDICARE

## 2024-11-30 LAB
ANION GAP SERPL CALC-SCNC: 13 MMOL/L (ref 10–20)
BUN SERPL-MCNC: 23 MG/DL (ref 6–23)
CALCIUM SERPL-MCNC: 8.6 MG/DL (ref 8.6–10.3)
CHLORIDE SERPL-SCNC: 101 MMOL/L (ref 98–107)
CO2 SERPL-SCNC: 26 MMOL/L (ref 21–32)
CREAT SERPL-MCNC: 1.52 MG/DL (ref 0.5–1.05)
EGFRCR SERPLBLD CKD-EPI 2021: 33 ML/MIN/1.73M*2
ERYTHROCYTE [DISTWIDTH] IN BLOOD BY AUTOMATED COUNT: 14.2 % (ref 11.5–14.5)
GLUCOSE BLD MANUAL STRIP-MCNC: 300 MG/DL (ref 74–99)
GLUCOSE BLD MANUAL STRIP-MCNC: 310 MG/DL (ref 74–99)
GLUCOSE BLD MANUAL STRIP-MCNC: 314 MG/DL (ref 74–99)
GLUCOSE BLD MANUAL STRIP-MCNC: 318 MG/DL (ref 74–99)
GLUCOSE SERPL-MCNC: 239 MG/DL (ref 74–99)
HCT VFR BLD AUTO: 36.8 % (ref 36–46)
HGB BLD-MCNC: 12.2 G/DL (ref 12–16)
MCH RBC QN AUTO: 28.6 PG (ref 26–34)
MCHC RBC AUTO-ENTMCNC: 33.2 G/DL (ref 32–36)
MCV RBC AUTO: 86 FL (ref 80–100)
NRBC BLD-RTO: 0 /100 WBCS (ref 0–0)
PLATELET # BLD AUTO: 223 X10*3/UL (ref 150–450)
POTASSIUM SERPL-SCNC: 4.1 MMOL/L (ref 3.5–5.3)
RBC # BLD AUTO: 4.26 X10*6/UL (ref 4–5.2)
SODIUM SERPL-SCNC: 136 MMOL/L (ref 136–145)
WBC # BLD AUTO: 11.4 X10*3/UL (ref 4.4–11.3)

## 2024-11-30 PROCEDURE — RXMED WILLOW AMBULATORY MEDICATION CHARGE

## 2024-11-30 PROCEDURE — 99232 SBSQ HOSP IP/OBS MODERATE 35: CPT | Performed by: INTERNAL MEDICINE

## 2024-11-30 PROCEDURE — 80048 BASIC METABOLIC PNL TOTAL CA: CPT | Performed by: INTERNAL MEDICINE

## 2024-11-30 PROCEDURE — 99231 SBSQ HOSP IP/OBS SF/LOW 25: CPT | Performed by: INTERNAL MEDICINE

## 2024-11-30 PROCEDURE — 36415 COLL VENOUS BLD VENIPUNCTURE: CPT | Performed by: INTERNAL MEDICINE

## 2024-11-30 PROCEDURE — 1100000001 HC PRIVATE ROOM DAILY

## 2024-11-30 PROCEDURE — 82947 ASSAY GLUCOSE BLOOD QUANT: CPT

## 2024-11-30 PROCEDURE — 2500000004 HC RX 250 GENERAL PHARMACY W/ HCPCS (ALT 636 FOR OP/ED): Performed by: PHYSICIAN ASSISTANT

## 2024-11-30 PROCEDURE — 85027 COMPLETE CBC AUTOMATED: CPT | Performed by: INTERNAL MEDICINE

## 2024-11-30 PROCEDURE — 2500000002 HC RX 250 W HCPCS SELF ADMINISTERED DRUGS (ALT 637 FOR MEDICARE OP, ALT 636 FOR OP/ED): Performed by: PHYSICIAN ASSISTANT

## 2024-11-30 PROCEDURE — 2500000001 HC RX 250 WO HCPCS SELF ADMINISTERED DRUGS (ALT 637 FOR MEDICARE OP): Performed by: PHYSICIAN ASSISTANT

## 2024-11-30 RX ORDER — CEFUROXIME AXETIL 500 MG/1
500 TABLET ORAL 2 TIMES DAILY
Qty: 20 TABLET | Refills: 0 | Status: SHIPPED | OUTPATIENT
Start: 2024-11-30 | End: 2024-12-10

## 2024-11-30 RX ORDER — CEFUROXIME AXETIL 500 MG/1
500 TABLET ORAL 2 TIMES DAILY
Qty: 20 TABLET | Refills: 0 | Status: SHIPPED | OUTPATIENT
Start: 2024-11-30 | End: 2024-11-30

## 2024-11-30 RX ADMIN — SIMVASTATIN 20 MG: 40 TABLET, FILM COATED ORAL at 20:48

## 2024-11-30 RX ADMIN — METOPROLOL TARTRATE 50 MG: 25 TABLET, FILM COATED ORAL at 08:32

## 2024-11-30 RX ADMIN — INSULIN LISPRO 4 UNITS: 100 INJECTION, SOLUTION INTRAVENOUS; SUBCUTANEOUS at 12:04

## 2024-11-30 RX ADMIN — CEFTRIAXONE SODIUM 1 G: 1 INJECTION, SOLUTION INTRAVENOUS at 20:48

## 2024-11-30 RX ADMIN — ASPIRIN 81 MG: 81 TABLET, COATED ORAL at 08:32

## 2024-11-30 RX ADMIN — Medication 1000 UNITS: at 08:32

## 2024-11-30 RX ADMIN — HEPARIN SODIUM 5000 UNITS: 5000 INJECTION, SOLUTION INTRAVENOUS; SUBCUTANEOUS at 14:19

## 2024-11-30 RX ADMIN — METOPROLOL TARTRATE 50 MG: 25 TABLET, FILM COATED ORAL at 20:48

## 2024-11-30 RX ADMIN — HEPARIN SODIUM 5000 UNITS: 5000 INJECTION, SOLUTION INTRAVENOUS; SUBCUTANEOUS at 21:03

## 2024-11-30 RX ADMIN — HEPARIN SODIUM 5000 UNITS: 5000 INJECTION, SOLUTION INTRAVENOUS; SUBCUTANEOUS at 05:29

## 2024-11-30 RX ADMIN — INSULIN LISPRO 3 UNITS: 100 INJECTION, SOLUTION INTRAVENOUS; SUBCUTANEOUS at 16:45

## 2024-11-30 ASSESSMENT — COGNITIVE AND FUNCTIONAL STATUS - GENERAL
CLIMB 3 TO 5 STEPS WITH RAILING: A LITTLE
DAILY ACTIVITIY SCORE: 24
CLIMB 3 TO 5 STEPS WITH RAILING: A LITTLE
DAILY ACTIVITIY SCORE: 24
MOBILITY SCORE: 23
CLIMB 3 TO 5 STEPS WITH RAILING: A LITTLE
MOBILITY SCORE: 23
DAILY ACTIVITIY SCORE: 24
DAILY ACTIVITIY SCORE: 24
CLIMB 3 TO 5 STEPS WITH RAILING: A LITTLE
DAILY ACTIVITIY SCORE: 24
MOBILITY SCORE: 24

## 2024-11-30 ASSESSMENT — PAIN - FUNCTIONAL ASSESSMENT
PAIN_FUNCTIONAL_ASSESSMENT: 0-10
PAIN_FUNCTIONAL_ASSESSMENT: 0-10

## 2024-11-30 ASSESSMENT — PAIN SCALES - GENERAL
PAINLEVEL_OUTOF10: 0 - NO PAIN

## 2024-11-30 NOTE — CARE PLAN
The patient's goals for the shift include      The clinical goals for the shift include patient will remain safe during the shift    Over the shift, the patient did make progress toward the following goals.     Problem: Fall/Injury  Goal: Not fall by end of shift  Outcome: Progressing  Goal: Be free from injury by end of the shift  Outcome: Progressing  Goal: Verbalize understanding of personal risk factors for fall in the hospital  Outcome: Progressing  Goal: Verbalize understanding of risk factor reduction measures to prevent injury from fall in the home  Outcome: Progressing  Goal: Use assistive devices by end of the shift  Outcome: Progressing  Goal: Pace activities to prevent fatigue by end of the shift  Outcome: Progressing     Problem: Safety - Adult  Goal: Free from fall injury  Outcome: Progressing

## 2024-11-30 NOTE — CARE PLAN
The patient's goals for the shift include   pt willr emain HDS    The clinical goals for the shift include patient will remain safe during the shift    Over the shift, the patient did make progress toward the following goals.

## 2024-11-30 NOTE — PROGRESS NOTES
"Lisa Thakkar is a 89 y.o. female on day 2 of admission presenting with Pyelonephritis.    Subjective   Patient again pretty insistent on not going home today.  Stating \"One more day\".  I did discuss with her that as I am not doing much more for her she would do better at home but she feels she will get stronger here in the hospital.  I have instructed nursing to get her up to the chair to hopefully prevent any further deterioration though I have concerns that she will likely become weaker and weaker and ultimately require a stay at a skilled nursing facility.       Objective     VITALS  Blood pressure 122/52, pulse 81, temperature 36.5 °C (97.7 °F), temperature source Temporal, resp. rate 17, height 1.549 m (5' 1\"), weight 72.1 kg (158 lb 14.4 oz), SpO2 93%.  Physical Exam  Vitals and nursing note reviewed.   Constitutional:       General: She is not in acute distress.     Appearance: Normal appearance. She is not ill-appearing.   HENT:      Head: Normocephalic and atraumatic.   Cardiovascular:      Rate and Rhythm: Normal rate and regular rhythm.      Heart sounds: Normal heart sounds.   Pulmonary:      Effort: Pulmonary effort is normal. No respiratory distress.      Breath sounds: Normal breath sounds. No wheezing.   Abdominal:      General: Abdomen is flat. Bowel sounds are normal. There is no distension.      Palpations: Abdomen is soft.      Tenderness: There is no abdominal tenderness.   Musculoskeletal:         General: No swelling or tenderness. Normal range of motion.   Skin:     General: Skin is warm and dry.      Capillary Refill: Capillary refill takes less than 2 seconds.   Neurological:      General: No focal deficit present.      Mental Status: She is alert and oriented to person, place, and time.   Psychiatric:         Mood and Affect: Mood normal.           Intake/Output last 3 Shifts:  I/O last 3 completed shifts:  In: 530 (7.4 mL/kg) [P.O.:480; IV Piggyback:50]  Out: 1400 (19.4 mL/kg) [Urine:1400 " (0.5 mL/kg/hr)]  Weight: 72.1 kg     Relevant Results  Results for orders placed or performed during the hospital encounter of 11/28/24 (from the past 24 hours)   POCT GLUCOSE   Result Value Ref Range    POCT Glucose 254 (H) 74 - 99 mg/dL   POCT GLUCOSE   Result Value Ref Range    POCT Glucose 250 (H) 74 - 99 mg/dL   CBC   Result Value Ref Range    WBC 11.4 (H) 4.4 - 11.3 x10*3/uL    nRBC 0.0 0.0 - 0.0 /100 WBCs    RBC 4.26 4.00 - 5.20 x10*6/uL    Hemoglobin 12.2 12.0 - 16.0 g/dL    Hematocrit 36.8 36.0 - 46.0 %    MCV 86 80 - 100 fL    MCH 28.6 26.0 - 34.0 pg    MCHC 33.2 32.0 - 36.0 g/dL    RDW 14.2 11.5 - 14.5 %    Platelets 223 150 - 450 x10*3/uL   Basic Metabolic Panel   Result Value Ref Range    Glucose 239 (H) 74 - 99 mg/dL    Sodium 136 136 - 145 mmol/L    Potassium 4.1 3.5 - 5.3 mmol/L    Chloride 101 98 - 107 mmol/L    Bicarbonate 26 21 - 32 mmol/L    Anion Gap 13 10 - 20 mmol/L    Urea Nitrogen 23 6 - 23 mg/dL    Creatinine 1.52 (H) 0.50 - 1.05 mg/dL    eGFR 33 (L) >60 mL/min/1.73m*2    Calcium 8.6 8.6 - 10.3 mg/dL   POCT GLUCOSE   Result Value Ref Range    POCT Glucose 310 (H) 74 - 99 mg/dL   POCT GLUCOSE   Result Value Ref Range    POCT Glucose 314 (H) 74 - 99 mg/dL   POCT GLUCOSE   Result Value Ref Range    POCT Glucose 300 (H) 74 - 99 mg/dL       Imaging Results  CT abdomen pelvis wo IV contrast   Final Result   1.  There is some moderate to severe right hydronephrosis and proximal   to mid hydroureter with surrounding infiltrative changes within the   perinephric space.  These findings are suggestive of a possible   obstructive uropathy.  No definite renal or ureteral calculi are   visualized.  This could possibly represent sequelae of a previously   passed calculus.  No definite obstructing lesion is visualized.   2.  Normal caliber appendix.   3.  Colonic diverticulosis.   4.  No intra-abdominal/pelvic fluid collections or pneumoperitoneum.   5.  Coronary artery calcifications.   6.  Other findings  as stated above.   Signed by Partha Cordero MD          Medications:  aspirin, 81 mg, oral, Daily  cefTRIAXone, 1 g, intravenous, q24h  cholecalciferol, 1,000 Units, oral, Daily  heparin (porcine), 5,000 Units, subcutaneous, q8h ZACARIAS  insulin lispro, 0-5 Units, subcutaneous, TID AC  [Held by provider] metFORMIN, 500 mg, oral, BID  metoprolol tartrate, 50 mg, oral, BID  simvastatin, 20 mg, oral, Nightly       PRN medications: acetaminophen **OR** acetaminophen **OR** acetaminophen, dextrose, dextrose, glucagon, glucagon, ondansetron ODT **OR** ondansetron, traMADol        Assessment/Plan          Principal Problem:    Pyelonephritis  Active Problems:    Hematuria    Leukocytosis    Glucosuria    Hydroureteronephrosis    Pyelonephritis   -Ceftriaxone daily  -Tylenol and Zofran as needed  -Urine culture growing enteric bacilli    DM type II  -Corrective insulin   -Endocrinology consult for poorly controlled diabetes and medical noncompliance  -Diabetic educator  -Hold home oral meds     CKD stage III  -Monitor creatinine while on abx         DVT Prophylaxis:  Heparin subq    Disposition:  Anticipate discharge tomorrow    Levi Mariscal, [facility]

## 2024-11-30 NOTE — PROGRESS NOTES
"Lisa Thakkar is a 89 y.o. female on day 2 of admission presenting with Pyelonephritis.    Subjective   No new complaint     Scheduled medications  aspirin, 81 mg, oral, Daily  cefTRIAXone, 1 g, intravenous, q24h  cholecalciferol, 1,000 Units, oral, Daily  heparin (porcine), 5,000 Units, subcutaneous, q8h ZACARIAS  insulin lispro, 0-5 Units, subcutaneous, TID AC  [Held by provider] metFORMIN, 500 mg, oral, BID  metoprolol tartrate, 50 mg, oral, BID  simvastatin, 20 mg, oral, Nightly      Objective   Physical Exam  Constitutional:       General: She is not in acute distress.  Neurological:      Mental Status: She is alert.         Last Recorded Vitals  Blood pressure 120/70, pulse 86, temperature 36.6 °C (97.8 °F), temperature source Oral, resp. rate 16, height 1.549 m (5' 1\"), weight 72.1 kg (158 lb 14.4 oz), SpO2 95%.  Intake/Output last 3 Shifts:  I/O last 3 completed shifts:  In: 530 (7.4 mL/kg) [P.O.:480; IV Piggyback:50]  Out: 1400 (19.4 mL/kg) [Urine:1400 (0.5 mL/kg/hr)]  Weight: 72.1 kg     Relevant Results  Results from last 7 days   Lab Units 11/29/24  1915 11/29/24  1511 11/29/24  1151 11/29/24  0756 11/29/24  0504 11/28/24  2249 11/28/24  1918   POCT GLUCOSE mg/dL 250* 254* 244* 268*  --  250*  --    GLUCOSE mg/dL  --   --   --   --  295*  --  249*     Assessment/Plan   Assessment & Plan  Pyelonephritis    Hematuria    Leukocytosis    Glucosuria    Hydroureteronephrosis    IMPRESSION  TYPE 2 DIABETES MELLITUS WITH HYPERGLYCEMIA  Longstanding diabetes with hyperglycemia  Stated side effects from pioglitazone and glimepiride  Tolerates metformin  Tight glucose control not indicated due to age and comorbidities.     RECOMMENDATIONS  Continue insulin corrective scale in the hospital  She does not want to inject insulin at home  Recommend she proceed with sitagliptin 50 mg/day as previously prescribed.  She will not commit to that medication at this time.  I would not recommend SGLT2-inihibitor as she already has " pyelonephritis  Appreciate diabetes education  Follow up with Dr. Bruce Treviño.     I will sign off.  Please call if any further questions.   Eliu Dunham MD

## 2024-12-01 VITALS
TEMPERATURE: 98.4 F | WEIGHT: 158.9 LBS | OXYGEN SATURATION: 95 % | SYSTOLIC BLOOD PRESSURE: 149 MMHG | HEART RATE: 71 BPM | RESPIRATION RATE: 16 BRPM | BODY MASS INDEX: 30 KG/M2 | HEIGHT: 61 IN | DIASTOLIC BLOOD PRESSURE: 74 MMHG

## 2024-12-01 LAB
ANION GAP SERPL CALC-SCNC: 12 MMOL/L (ref 10–20)
BACTERIA UR CULT: ABNORMAL
BUN SERPL-MCNC: 21 MG/DL (ref 6–23)
CALCIUM SERPL-MCNC: 8.6 MG/DL (ref 8.6–10.3)
CHLORIDE SERPL-SCNC: 104 MMOL/L (ref 98–107)
CO2 SERPL-SCNC: 23 MMOL/L (ref 21–32)
CREAT SERPL-MCNC: 1.2 MG/DL (ref 0.5–1.05)
EGFRCR SERPLBLD CKD-EPI 2021: 44 ML/MIN/1.73M*2
GLUCOSE BLD MANUAL STRIP-MCNC: 245 MG/DL (ref 74–99)
GLUCOSE BLD MANUAL STRIP-MCNC: 263 MG/DL (ref 74–99)
GLUCOSE SERPL-MCNC: 257 MG/DL (ref 74–99)
HOLD SPECIMEN: NORMAL
POTASSIUM SERPL-SCNC: 3.8 MMOL/L (ref 3.5–5.3)
SODIUM SERPL-SCNC: 135 MMOL/L (ref 136–145)

## 2024-12-01 PROCEDURE — 2500000002 HC RX 250 W HCPCS SELF ADMINISTERED DRUGS (ALT 637 FOR MEDICARE OP, ALT 636 FOR OP/ED): Performed by: PHYSICIAN ASSISTANT

## 2024-12-01 PROCEDURE — 2500000001 HC RX 250 WO HCPCS SELF ADMINISTERED DRUGS (ALT 637 FOR MEDICARE OP): Performed by: PHYSICIAN ASSISTANT

## 2024-12-01 PROCEDURE — 2500000004 HC RX 250 GENERAL PHARMACY W/ HCPCS (ALT 636 FOR OP/ED): Performed by: PHYSICIAN ASSISTANT

## 2024-12-01 PROCEDURE — 99239 HOSP IP/OBS DSCHRG MGMT >30: CPT | Performed by: INTERNAL MEDICINE

## 2024-12-01 PROCEDURE — 82947 ASSAY GLUCOSE BLOOD QUANT: CPT

## 2024-12-01 PROCEDURE — 80048 BASIC METABOLIC PNL TOTAL CA: CPT | Performed by: INTERNAL MEDICINE

## 2024-12-01 PROCEDURE — 36415 COLL VENOUS BLD VENIPUNCTURE: CPT | Performed by: INTERNAL MEDICINE

## 2024-12-01 RX ADMIN — HEPARIN SODIUM 5000 UNITS: 5000 INJECTION, SOLUTION INTRAVENOUS; SUBCUTANEOUS at 14:13

## 2024-12-01 RX ADMIN — ASPIRIN 81 MG: 81 TABLET, COATED ORAL at 09:33

## 2024-12-01 RX ADMIN — INSULIN LISPRO 3 UNITS: 100 INJECTION, SOLUTION INTRAVENOUS; SUBCUTANEOUS at 09:33

## 2024-12-01 RX ADMIN — METOPROLOL TARTRATE 50 MG: 25 TABLET, FILM COATED ORAL at 09:33

## 2024-12-01 RX ADMIN — HEPARIN SODIUM 5000 UNITS: 5000 INJECTION, SOLUTION INTRAVENOUS; SUBCUTANEOUS at 06:38

## 2024-12-01 RX ADMIN — INSULIN LISPRO 2 UNITS: 100 INJECTION, SOLUTION INTRAVENOUS; SUBCUTANEOUS at 14:13

## 2024-12-01 RX ADMIN — Medication 1000 UNITS: at 09:33

## 2024-12-01 ASSESSMENT — PAIN - FUNCTIONAL ASSESSMENT
PAIN_FUNCTIONAL_ASSESSMENT: 0-10
PAIN_FUNCTIONAL_ASSESSMENT: 0-10

## 2024-12-01 ASSESSMENT — PAIN SCALES - GENERAL
PAINLEVEL_OUTOF10: 0 - NO PAIN
PAINLEVEL_OUTOF10: 0 - NO PAIN

## 2024-12-01 NOTE — CARE PLAN
The patient's goals for the shift include      The clinical goals for the shift include Patient to ambulate for shift      Problem: Fall/Injury  Goal: Not fall by end of shift  Outcome: Progressing  Goal: Be free from injury by end of the shift  Outcome: Progressing  Goal: Verbalize understanding of personal risk factors for fall in the hospital  Outcome: Progressing  Goal: Verbalize understanding of risk factor reduction measures to prevent injury from fall in the home  Outcome: Progressing  Goal: Use assistive devices by end of the shift  Outcome: Progressing  Goal: Pace activities to prevent fatigue by end of the shift  Outcome: Progressing     Problem: Pain - Adult  Goal: Verbalizes/displays adequate comfort level or baseline comfort level  Outcome: Progressing     Problem: Safety - Adult  Goal: Free from fall injury  Outcome: Progressing     Problem: Discharge Planning  Goal: Discharge to home or other facility with appropriate resources  Outcome: Progressing     Problem: Chronic Conditions and Co-morbidities  Goal: Patient's chronic conditions and co-morbidity symptoms are monitored and maintained or improved  Outcome: Progressing     Problem: Pain  Goal: Takes deep breaths with improved pain control throughout the shift  Outcome: Progressing  Goal: Turns in bed with improved pain control throughout the shift  Outcome: Progressing  Goal: Walks with improved pain control throughout the shift  Outcome: Progressing  Goal: Performs ADL's with improved pain control throughout shift  Outcome: Progressing  Goal: Participates in PT with improved pain control throughout the shift  Outcome: Progressing  Goal: Free from opioid side effects throughout the shift  Outcome: Progressing  Goal: Free from acute confusion related to pain meds throughout the shift  Outcome: Progressing     Problem: Diabetes  Goal: Achieve decreasing blood glucose levels by end of shift  Outcome: Progressing  Goal: Increase stability of blood  glucose readings by end of shift  Outcome: Progressing  Goal: Decrease in ketones present in urine by end of shift  Outcome: Progressing  Goal: Maintain electrolyte levels within acceptable range throughout shift  Outcome: Progressing  Goal: Maintain glucose levels >70mg/dl to <250mg/dl throughout shift  Outcome: Progressing  Goal: No changes in neurological exam by end of shift  Outcome: Progressing  Goal: Learn about and adhere to nutrition recommendations by end of shift  Outcome: Progressing  Goal: Vital signs within normal range for age by end of shift  Outcome: Progressing  Goal: Increase self care and/or family involovement by end of shift  Outcome: Progressing  Goal: Receive DSME education by end of shift  Outcome: Progressing

## 2024-12-01 NOTE — CARE PLAN
The patient's goals for the shift include to remain safe    The clinical goals for the shift include maintain safety    Over the shift, the patient did not make progress toward the following goals. Barriers to progression include . Recommendations to address these barriers include manage symptoms.

## 2024-12-01 NOTE — DISCHARGE SUMMARY
Discharge Diagnosis  Pyelonephritis    Issues Requiring Follow-Up  PCP follow-up  Endocrinology follow-up    Discharge Meds     Your medication list        START taking these medications        Instructions Last Dose Given Next Dose Due   cefuroxime 500 mg tablet  Commonly known as: Ceftin      Take 1 tablet (500 mg) by mouth 2 times a day for 10 days.              CONTINUE taking these medications        Instructions Last Dose Given Next Dose Due   aspirin 81 mg EC tablet           biotin 5 mg capsule           CENTRUM ORAL           cholecalciferol 25 MCG (1000 UT) tablet  Commonly known as: Vitamin D-3           FreeStyle lancets 28 gauge           glimepiride 2 mg tablet  Commonly known as: Amaryl      Take 1 tablet (2 mg) by mouth 2 times a day.       metFORMIN 500 mg tablet  Commonly known as: Glucophage           metoprolol tartrate 50 mg tablet  Commonly known as: Lopressor      Take 1 tablet by mouth 2 times a day.       phenylephrine-cocoa butter 0.25-88.44 % suppository  Commonly known as: Hemorrhoidal           Prodigy No Coding strip  Generic drug: blood sugar diagnostic           simvastatin 20 mg tablet  Commonly known as: Zocor      Take 1 tablet (20 mg) by mouth once daily at bedtime.       SITagliptin phosphate 50 mg tablet  Commonly known as: Januvia      Take 1 tablet (50 mg) by mouth once daily.                 Where to Get Your Medications        These medications were sent to [facility]  [address] , [address], [facility] [address]      Hours: 8 AM to 6 PM Mon-Fri, 9 AM to 1 PM Saturday Phone: [phone]   cefuroxime 500 mg tablet         Test Results Pending At Discharge  Pending Labs       Order Current Status    Extra Urine Gray Tube Collected (11/28/24 2004)    Urinalysis with Reflex Culture and Microscopic In process            Procedures       Hospital Course   Lisa was admitted to hospital with acute pyelonephritis.  Her symptoms did dramatically improve on IV  "antibiotics.  Urine cultures did grow Klebsiella that was fairly sensitive save for penicillins alone.  She will need to finish out an extended course of antibiotics as this is a complicated urinary tract infection.  She was also evaluated by the endocrinologist for uncontrolled blood sugars.  She is hesitant to start any new medications and refuses to start insulin at all.  She will need to follow-up closely with her endocrinologist.  At this time she is otherwise hemodynamically stable appropriate for discharge.  This plan discussed with her and she is in agreement.  All questions were answered.    Blood pressure 144/75, pulse 76, temperature 36.9 °C (98.4 °F), temperature source Temporal, resp. rate 16, height 1.549 m (5' 1\"), weight 72.1 kg (158 lb 14.4 oz), SpO2 95%.  Pertinent Physical Exam At Time of Discharge  Physical Exam  Vitals and nursing note reviewed.   Constitutional:       General: She is not in acute distress.     Appearance: Normal appearance.   HENT:      Head: Normocephalic and atraumatic.   Cardiovascular:      Rate and Rhythm: Normal rate and regular rhythm.      Heart sounds: Normal heart sounds.   Pulmonary:      Effort: Pulmonary effort is normal. No respiratory distress.      Breath sounds: Normal breath sounds. No wheezing.   Abdominal:      General: Abdomen is flat. Bowel sounds are normal. There is no distension.      Palpations: Abdomen is soft.      Tenderness: There is no abdominal tenderness.   Musculoskeletal:         General: No swelling or tenderness. Normal range of motion.   Skin:     General: Skin is warm and dry.      Capillary Refill: Capillary refill takes less than 2 seconds.   Neurological:      General: No focal deficit present.      Mental Status: She is alert and oriented to person, place, and time.   Psychiatric:         Mood and Affect: Mood normal.         Outpatient Follow-Up  Future Appointments   Date Time Provider Department Center   3/26/2025  3:00 PM Sidney FERRER" MD Bimal [id number] [facility]   4/15/2025  3:30 PM Andrés Treviño MD [id number] [facility]   5/28/2025  3:00 PM Kelvin Segovia MD [id number] [facility]         Levi Mariscal, DO [facility]     Time spent during this discharge: >30 min

## 2024-12-02 ENCOUNTER — PATIENT OUTREACH (OUTPATIENT)
Dept: PRIMARY CARE | Facility: CLINIC | Age: 89
End: 2024-12-02
Payer: MEDICARE

## 2024-12-02 NOTE — PROGRESS NOTES
Discharge Facility:[facility]  Discharge Diagnosis:Pyelonephritis  Admission Date:11/28/24  Discharge Date: 12/01/24    PCP Appointment Date:12/10/24  Specialist Appointment Date:   Hospital Encounter and Summary Linked: Yes  See discharge assessment below for further details  Engagement  Call Start Time: 1102 (12/2/2024 11:02 AM)    Medications  Medications reviewed with patient/caregiver?: Yes (ceftin 500mg) (12/2/2024 11:02 AM)  Is the patient having any side effects they believe may be caused by any medication additions or changes?: No (12/2/2024 11:02 AM)  Does the patient have all medications ordered at discharge?: Yes (12/2/2024 11:02 AM)  Is the patient taking all medications as directed (includes completed medication regime)?: Yes (12/2/2024 11:02 AM)  Medication Comments: has meds (12/2/2024 11:02 AM)    Appointments  Does the patient have a primary care provider?: Yes (12/2/2024 11:02 AM)  Care Management Interventions: Verified appointment date/time/provider (12/2/2024 11:02 AM)    Self Management  What is the home health agency?: na (12/2/2024 11:02 AM)  Has home health visited the patient within 72 hours of discharge?: Not applicable (12/2/2024 11:02 AM)  What Durable Medical Equipment (DME) was ordered?: n/a (12/2/2024 11:02 AM)  Has all Durable Medical Equipment (DME) been delivered?: No (12/2/2024 11:02 AM)    Patient Teaching  Does the patient have access to their discharge instructions?: Yes (12/2/2024 11:02 AM)  Care Management Interventions: Reviewed instructions with patient (12/2/2024 11:02 AM)  What is the patient's perception of their health status since discharge?: Improving (12/2/2024 11:02 AM)  Is the patient/caregiver able to teach back the hierarchy of who to call/visit for symptoms/problems? PCP, Specialist, Home Health nurse, Urgent Care, ED, 911: Yes (12/2/2024 11:02 AM)    Wrap Up  Wrap Up Additional Comments: discused discharge patient stated that she is feeling a little better but  her back is hurting she said she her urine was clear and had a bowel movement today. She started back on her diabetic diet  that she follows from home. provided contact information encouraged call with any questions. (12/2/2024 11:02 AM)

## 2024-12-03 ENCOUNTER — PATIENT OUTREACH (OUTPATIENT)
Dept: CARE COORDINATION | Age: 89
End: 2024-12-03
Payer: MEDICARE

## 2024-12-10 ENCOUNTER — APPOINTMENT (OUTPATIENT)
Dept: PRIMARY CARE | Facility: CLINIC | Age: 89
End: 2024-12-10
Payer: MEDICARE

## 2024-12-10 VITALS
HEART RATE: 85 BPM | RESPIRATION RATE: 16 BRPM | TEMPERATURE: 97.2 F | WEIGHT: 155 LBS | BODY MASS INDEX: 29.29 KG/M2 | OXYGEN SATURATION: 95 % | SYSTOLIC BLOOD PRESSURE: 140 MMHG | DIASTOLIC BLOOD PRESSURE: 78 MMHG

## 2024-12-10 DIAGNOSIS — N10 ACUTE PYELONEPHRITIS: Primary | ICD-10-CM

## 2024-12-10 PROBLEM — F43.21 GRIEF: Status: RESOLVED | Noted: 2023-01-25 | Resolved: 2024-12-10

## 2024-12-10 PROBLEM — F32.A CHRONIC DEPRESSION: Status: RESOLVED | Noted: 2023-01-25 | Resolved: 2024-12-10

## 2024-12-10 PROCEDURE — 1159F MED LIST DOCD IN RCRD: CPT | Performed by: INTERNAL MEDICINE

## 2024-12-10 PROCEDURE — 1036F TOBACCO NON-USER: CPT | Performed by: INTERNAL MEDICINE

## 2024-12-10 PROCEDURE — 1123F ACP DISCUSS/DSCN MKR DOCD: CPT | Performed by: INTERNAL MEDICINE

## 2024-12-10 PROCEDURE — 99495 TRANSJ CARE MGMT MOD F2F 14D: CPT | Performed by: INTERNAL MEDICINE

## 2024-12-10 PROCEDURE — 1111F DSCHRG MED/CURRENT MED MERGE: CPT | Performed by: INTERNAL MEDICINE

## 2024-12-10 ASSESSMENT — ENCOUNTER SYMPTOMS
GASTROINTESTINAL NEGATIVE: 1
RESPIRATORY NEGATIVE: 1
CARDIOVASCULAR NEGATIVE: 1
MUSCULOSKELETAL NEGATIVE: 1
EYES NEGATIVE: 1
ENDOCRINE NEGATIVE: 1
CONSTITUTIONAL NEGATIVE: 1
HEMATOLOGIC/LYMPHATIC NEGATIVE: 1
NEUROLOGICAL NEGATIVE: 1
PSYCHIATRIC NEGATIVE: 1
ALLERGIC/IMMUNOLOGIC NEGATIVE: 1

## 2024-12-10 NOTE — PROGRESS NOTES
Subjective   Patient ID: Lisa Thakkar is a 89 y.o. female who presents for Hospital Follow-up (Hospital follow up).  Patient presents in follow up from hospitalization from 11/28 -12/1/2024 where her DC Dx was acute pyelonephritis.  Review of medical record reveals only her ER record and that she was admitted.  There are no progress notes or DC summary other than TCM report.  She was DC on Ceftin 500 mg.    Patient reports she is now doing well with no  Sx.  No systemic Sx such as flank pain, fever or chills.  She refuses to provide us with a fresh urine specimen for urinalysis today.    Patient was contacted by Transitional Care Management services after discharge. This encounter and supporting documentation was reviewed.     The complexity of medical decision making for this patient's transitional care is moderate.         Review of Systems   Constitutional: Negative.    HENT: Negative.     Eyes: Negative.    Respiratory: Negative.     Cardiovascular: Negative.    Gastrointestinal: Negative.    Endocrine: Negative.    Genitourinary: Negative.    Musculoskeletal: Negative.    Skin: Negative.    Allergic/Immunologic: Negative.    Neurological: Negative.    Hematological: Negative.    Psychiatric/Behavioral: Negative.         Objective     Blood pressure 140/78, pulse 85, temperature 36.2 °C (97.2 °F), temperature source Temporal, resp. rate 16, weight 70.3 kg (155 lb), SpO2 95%.   Physical Exam  Constitutional:       Appearance: Normal appearance.   Neck:      Vascular: No carotid bruit.   Cardiovascular:      Rate and Rhythm: Normal rate and regular rhythm.   Pulmonary:      Effort: Pulmonary effort is normal.      Breath sounds: Normal breath sounds.   Abdominal:      Tenderness: There is no right CVA tenderness or left CVA tenderness.   Musculoskeletal:      Cervical back: Normal range of motion and neck supple.   Skin:     General: Skin is warm and dry.   Neurological:      General: No focal deficit present.       Mental Status: She is alert and oriented to person, place, and time.         Assessment/Plan   Problem List Items Addressed This Visit    None  Visit Diagnoses       Acute pyelonephritis    -  Primary          Patient clinically appears to have recovered from acute Sx.  As noted in HPI, she refuses to provide us with a fresh urine for urinalysis today.  She states that she has not yet finished the antibiotic.  She will continue follow up with Endo and Cardio for specified issues.      Follow up as scheduled in our office

## 2024-12-11 ENCOUNTER — TELEPHONE (OUTPATIENT)
Dept: OBSTETRICS AND GYNECOLOGY | Facility: CLINIC | Age: 89
End: 2024-12-11
Payer: MEDICARE

## 2024-12-11 DIAGNOSIS — N39.0 ACUTE URINARY TRACT INFECTION: Primary | ICD-10-CM

## 2024-12-11 NOTE — TELEPHONE ENCOUNTER
Dr Greenberg    Lisa Thakkar went to [facility] about a week ago because she had a bladder and kidney infection. She wants to know if you would put lab orders in to check to see if the infection is gone. She went to her PCP with her urine. The office would not take the sample and would not give her lab orders to go to one of our labs. She is very upset and crying because no one will help her. If you are able to place the order please do ASAP.    Finally if and when the order is place please let me or one of the staff know so that we may reach out to he to tell her she can go to the lab. She is 89yrs old and not good with technology. Please and thank you!

## 2024-12-13 ENCOUNTER — PATIENT OUTREACH (OUTPATIENT)
Dept: PRIMARY CARE | Facility: CLINIC | Age: 89
End: 2024-12-13
Payer: MEDICARE

## 2024-12-13 ENCOUNTER — LAB (OUTPATIENT)
Dept: LAB | Facility: LAB | Age: 89
End: 2024-12-13
Payer: MEDICARE

## 2024-12-13 DIAGNOSIS — N39.0 ACUTE URINARY TRACT INFECTION: ICD-10-CM

## 2024-12-13 PROCEDURE — 81001 URINALYSIS AUTO W/SCOPE: CPT

## 2024-12-13 NOTE — PROGRESS NOTES
Call regarding appt. with PCP on 12/10/24  after hospitalization.  At time of outreach call the patient feels as if their condition has improved some  since last visit.She stated that she just feels a little tired.  Reviewed the PCP appointment with the pt and addressed any questions or concerns.

## 2024-12-14 LAB
APPEARANCE UR: ABNORMAL
BILIRUB UR STRIP.AUTO-MCNC: NEGATIVE MG/DL
CAOX CRY #/AREA UR COMP ASSIST: ABNORMAL /HPF
COLOR UR: YELLOW
GLUCOSE UR STRIP.AUTO-MCNC: ABNORMAL MG/DL
HOLD SPECIMEN: NORMAL
KETONES UR STRIP.AUTO-MCNC: NEGATIVE MG/DL
LEUKOCYTE ESTERASE UR QL STRIP.AUTO: NEGATIVE
MUCOUS THREADS #/AREA URNS AUTO: ABNORMAL /LPF
NITRITE UR QL STRIP.AUTO: NEGATIVE
PH UR STRIP.AUTO: 5.5 [PH]
PROT UR STRIP.AUTO-MCNC: ABNORMAL MG/DL
RBC # UR STRIP.AUTO: NEGATIVE /UL
RBC #/AREA URNS AUTO: ABNORMAL /HPF
SP GR UR STRIP.AUTO: 1.02
SQUAMOUS #/AREA URNS AUTO: ABNORMAL /HPF
UROBILINOGEN UR STRIP.AUTO-MCNC: NORMAL MG/DL
WBC #/AREA URNS AUTO: ABNORMAL /HPF

## 2024-12-18 ENCOUNTER — TELEPHONE (OUTPATIENT)
Dept: OBSTETRICS AND GYNECOLOGY | Facility: CLINIC | Age: 89
End: 2024-12-18
Payer: MEDICARE

## 2024-12-18 NOTE — TELEPHONE ENCOUNTER
Name/ verified  Pt made aware of negative urine cx. Currently denies any symptoms and believes she was dehydrated.  Denies N/V  Afebrile   She has been increasing her fluid intake and will call if symptoms start again.

## 2024-12-18 NOTE — TELEPHONE ENCOUNTER
Patient not sure of result but she need medication. Sent to pharmacy.        Patient is calling about results of her urinalysis. She states that she was hospitalized and given antibiotics but she says that the symptoms have not gone away. Please call with the results from the labs that Dr Greenberg put in last week. Please call ASAP patient is uncomfortable.

## 2024-12-20 ENCOUNTER — OFFICE VISIT (OUTPATIENT)
Dept: URGENT CARE | Age: 89
End: 2024-12-20
Payer: MEDICARE

## 2024-12-20 VITALS — OXYGEN SATURATION: 98 % | HEART RATE: 88 BPM | TEMPERATURE: 98.1 F

## 2024-12-20 DIAGNOSIS — R31.9 URINARY TRACT INFECTION WITH HEMATURIA, SITE UNSPECIFIED: Primary | ICD-10-CM

## 2024-12-20 DIAGNOSIS — R35.0 URINARY FREQUENCY: ICD-10-CM

## 2024-12-20 DIAGNOSIS — N39.0 URINARY TRACT INFECTION WITH HEMATURIA, SITE UNSPECIFIED: Primary | ICD-10-CM

## 2024-12-20 LAB
POC APPEARANCE, URINE: ABNORMAL
POC BILIRUBIN, URINE: NEGATIVE
POC BLOOD, URINE: ABNORMAL
POC COLOR, URINE: YELLOW
POC GLUCOSE, URINE: NEGATIVE MG/DL
POC KETONES, URINE: NEGATIVE MG/DL
POC LEUKOCYTES, URINE: ABNORMAL
POC NITRITE,URINE: NEGATIVE
POC PH, URINE: 5.5 PH
POC PROTEIN, URINE: ABNORMAL MG/DL
POC SPECIFIC GRAVITY, URINE: 1.01
POC UROBILINOGEN, URINE: 0.2 EU/DL

## 2024-12-20 RX ORDER — CEPHALEXIN 500 MG/1
500 CAPSULE ORAL 2 TIMES DAILY
Qty: 14 CAPSULE | Refills: 0 | Status: SHIPPED | OUTPATIENT
Start: 2024-12-20 | End: 2024-12-27

## 2024-12-20 NOTE — TELEPHONE ENCOUNTER
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  Patient states starting yesterday she started yesterday she now has burning with urination and a \"pinching\" pain with urination, also having urinary frequency with little output and epidoses of urine incontinence  Message sent to Provider for treatment options  Janett Kapadia RN    "

## 2024-12-20 NOTE — TELEPHONE ENCOUNTER
Patient has not been seen in office since 2021, unable to prescribe medications  RN educated Patient that she needs to follow up with her PCP or an Urgent Care  Annual Exam scheduled for January 2025  Patient verbalizes understanding  Janett Kapadia RN

## 2024-12-20 NOTE — PROGRESS NOTES
Subjective   Patient ID: Lisa Thakkar is a 89 y.o. female. They present today with a chief complaint of Urinary Frequency (yesterday).    History of Present Illness  89 yo female coming in for urinary frequency and dysuria. She states these symptoms started yesterday. She denies any fevers or chills. She denies any nausea or vomiting.     Past Medical History  Allergies as of 12/20/2024 - Reviewed 12/20/2024   Allergen Reaction Noted    Pioglitazone Shortness of breath 10/15/2024    Amoxicillin Unknown 01/25/2023    Axid [nizatidine] Unknown 01/25/2023    Bactrim [sulfamethoxazole-trimethoprim] Unknown 01/25/2023    Celebrex [celecoxib] Unknown 01/25/2023    Ciprofloxacin Unknown 01/25/2023    Clindamycin Unknown 10/05/2023    Cortisone Unknown 10/05/2023    Erythromycin Unknown 01/25/2023    Hydrocortisone Unknown 01/25/2023    Lansoprazole Unknown 01/25/2023    Methylprednisolone Unknown 01/25/2023    Niacin Unknown 01/25/2023    Prevacid naprapac [lansoprazole-naproxen] Unknown 10/05/2023    Protonix [pantoprazole] Unknown 01/25/2023    Robaxin [methocarbamol] Unknown 01/25/2023    Sulfa (sulfonamide antibiotics) Unknown 01/25/2023    Welchol [colesevelam] Unknown 01/25/2023       (Not in a hospital admission)       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    Encounter for gynecological examination (general) (routine) without abnormal findings 06/06/2022    Women's annual routine gynecological examination    Other conditions influencing health status 05/21/2014    Follow-up arranged    Other conditions influencing health status 08/28/2014    Follow-up arranged    Personal history of diseases of the blood and blood-forming organs and certain disorders involving the immune mechanism 04/20/2016    History of anemia    Personal history of diseases of the blood and blood-forming organs and  certain disorders involving the immune mechanism     History of bleeding disorder    Personal history of diseases of the skin and subcutaneous tissue     History of urticaria    Personal history of malignant neoplasm, unspecified     History of malignant neoplasm    Personal history of other endocrine, nutritional and metabolic disease     History of hyperlipidemia    Personal history of other endocrine, nutritional and metabolic disease     History of diabetes mellitus    Personal history of urinary calculi     History of renal calculi    Pruritus, unspecified 09/25/2015    Itching of ear    Unspecified cataract     Cataract, right eye    Unspecified cataract     Cataract, left    Vitreous degeneration, left eye     PVD (posterior vitreous detachment), left eye       Past Surgical History:   Procedure Laterality Date    BREAST SURGERY  01/19/2017    Breast Surgery    CARDIAC SURGERY  01/19/2017    Heart Surgery    CATARACT EXTRACTION  07/20/2017    Cataract Surgery    CORONARY ARTERY BYPASS GRAFT  02/10/2017    CABG    HAND SURGERY  01/19/2017    Hand Surgery                                                                                                                                                          HYSTERECTOMY  10/30/2014    Hysterectomy    TONSILLECTOMY  10/30/2014    Tonsillectomy        reports that she has never smoked. She has never been exposed to tobacco smoke. She has never used smokeless tobacco. She reports that she does not drink alcohol and does not use drugs.    Review of Systems  Review of Systems:  General: No weight loss, fatigue, anorexia, insomnia, fever, chills.  Cardiac: No chest pain, palpitations, syncope, near syncope.  Pulmonary:  No shortness of breath, cough, hemoptysis  Heme/lymph: No swollen glands, fever, bleeding  GI: No abdominal pain, change in bowel habits, melena, hematemesis, hematochezia, nausea, vomiting, or diarrhea  : No discharge, positive dysuria, frequency, no  urgency, hematuria  Musculoskeletal: No limb pain, joint pain, joint swelling.  Skin: No rashes  Neuro: No numbness, tingling, headaches                                 Objective    Vitals:    12/20/24 1705   Pulse: 88   Temp: 36.7 °C (98.1 °F)   SpO2: 98%     No LMP recorded.    Physical Exam  Physical Exam:  General: Vital noted, no distress. Afebrile  Cardiac: Regular rate and rhythm, no murmur  Pulmonary: Lungs clear bilaterally with good aeration. No adventitious breath sounds.  Abdomen: Soft, nontender, nonsurgical. No peritoneal signs. Normoactive bowel sounds.   Musculoskeletal: No peripheral edema.   Skin: No rashes      Procedures    Point of Care Test & Imaging Results from this visit  Results for orders placed or performed in visit on 12/20/24   POCT UA Automated manually resulted   Result Value Ref Range    POC Color, Urine Yellow Straw, Yellow, Light-Yellow    POC Appearance, Urine Cloudy (A) Clear    POC Glucose, Urine NEGATIVE NEGATIVE mg/dl    POC Bilirubin, Urine NEGATIVE NEGATIVE    POC Ketones, Urine NEGATIVE NEGATIVE mg/dl    POC Specific Gravity, Urine 1.015 1.005 - 1.035    POC Blood, Urine SMALL (1+) (A) NEGATIVE    POC PH, Urine 5.5 No Reference Range Established PH    POC Protein, Urine 30 (1+) (A) NEGATIVE mg/dl    POC Urobilinogen, Urine 0.2 0.2, 1.0 EU/DL    Poc Nitrite, Urine NEGATIVE NEGATIVE    POC Leukocytes, Urine SMALL (1+) (A) NEGATIVE      No results found.    Diagnostic study results (if any) were reviewed by WENCESLAO Goodrich.    Assessment/Plan   Allergies, medications, history, and pertinent labs/EKGs/Imaging reviewed by WENCESLAO Goodrich.     Medical Decision Making  Testing: UA and urine culture  Treatment: Keflex prescribed  Differential: 1) uti , 2) dysuria , 3) frequency  Plan: Patient will follow up with the PCP in the next 2-3 days. Return for any worsening symptoms or go to the ER for further evaluation. Patient understands return precautions and  discharge insturctions.  Impression:   1) uti      Orders and Diagnoses  Diagnoses and all orders for this visit:  Urinary tract infection with hematuria, site unspecified  -     Urine Culture  -     cephalexin (Keflex) 500 mg capsule; Take 1 capsule (500 mg) by mouth 2 times a day for 7 days.  Urinary frequency  -     POCT UA Automated manually resulted      Medical Admin Record      Patient disposition: Home    Electronically signed by WENCESLAO Goodrich  5:27 PM

## 2024-12-27 ENCOUNTER — PATIENT OUTREACH (OUTPATIENT)
Dept: PRIMARY CARE | Facility: CLINIC | Age: 89
End: 2024-12-27
Payer: MEDICARE

## 2024-12-31 DIAGNOSIS — E11.9 TYPE 2 DIABETES MELLITUS WITHOUT COMPLICATION, WITHOUT LONG-TERM CURRENT USE OF INSULIN (MULTI): Primary | ICD-10-CM

## 2025-01-01 RX ORDER — METFORMIN HYDROCHLORIDE 500 MG/1
500 TABLET ORAL
Qty: 180 TABLET | Refills: 1 | Status: SHIPPED | OUTPATIENT
Start: 2025-01-01 | End: 2025-06-30

## 2025-01-13 ENCOUNTER — LAB (OUTPATIENT)
Dept: LAB | Facility: LAB | Age: 89
End: 2025-01-13
Payer: MEDICARE

## 2025-01-13 ENCOUNTER — OFFICE VISIT (OUTPATIENT)
Dept: OBSTETRICS AND GYNECOLOGY | Facility: CLINIC | Age: 89
End: 2025-01-13
Payer: MEDICARE

## 2025-01-13 VITALS
BODY MASS INDEX: 28.7 KG/M2 | WEIGHT: 152 LBS | DIASTOLIC BLOOD PRESSURE: 73 MMHG | HEIGHT: 61 IN | SYSTOLIC BLOOD PRESSURE: 150 MMHG

## 2025-01-13 DIAGNOSIS — R39.9 UTI SYMPTOMS: ICD-10-CM

## 2025-01-13 DIAGNOSIS — R32 URINARY INCONTINENCE, UNSPECIFIED TYPE: ICD-10-CM

## 2025-01-13 DIAGNOSIS — N32.81 OAB (OVERACTIVE BLADDER): ICD-10-CM

## 2025-01-13 DIAGNOSIS — R15.1 FECAL SMEARING: ICD-10-CM

## 2025-01-13 DIAGNOSIS — Z85.42 HISTORY OF ENDOMETRIAL CANCER: Primary | ICD-10-CM

## 2025-01-13 PROCEDURE — 1159F MED LIST DOCD IN RCRD: CPT | Performed by: OBSTETRICS & GYNECOLOGY

## 2025-01-13 PROCEDURE — 1126F AMNT PAIN NOTED NONE PRSNT: CPT | Performed by: OBSTETRICS & GYNECOLOGY

## 2025-01-13 PROCEDURE — 87086 URINE CULTURE/COLONY COUNT: CPT

## 2025-01-13 PROCEDURE — 99213 OFFICE O/P EST LOW 20 MIN: CPT | Performed by: OBSTETRICS & GYNECOLOGY

## 2025-01-13 PROCEDURE — 1123F ACP DISCUSS/DSCN MKR DOCD: CPT | Performed by: OBSTETRICS & GYNECOLOGY

## 2025-01-13 ASSESSMENT — ENCOUNTER SYMPTOMS
HEMATOLOGIC/LYMPHATIC NEGATIVE: 0
ALLERGIC/IMMUNOLOGIC NEGATIVE: 0
DEPRESSION: 0
PSYCHIATRIC NEGATIVE: 0
NEUROLOGICAL NEGATIVE: 0
OCCASIONAL FEELINGS OF UNSTEADINESS: 0
LOSS OF SENSATION IN FEET: 0
MUSCULOSKELETAL NEGATIVE: 0
EYES NEGATIVE: 0
CONSTITUTIONAL NEGATIVE: 0
FREQUENCY: 1
GASTROINTESTINAL NEGATIVE: 0
RESPIRATORY NEGATIVE: 0
ENDOCRINE NEGATIVE: 0
CARDIOVASCULAR NEGATIVE: 0

## 2025-01-13 ASSESSMENT — PAIN SCALES - GENERAL: PAINLEVEL_OUTOF10: 0-NO PAIN

## 2025-01-13 NOTE — PROGRESS NOTES
Subjective   Patient ID: Lisa Thakkar is a 89 y.o. female who presents for Annual Exam (No pap on file last mammogram 3/8/17 benign).  ANA Gonzalez is an 89-year-old female nulligravida known to the practice here for annual checkup.    Remote history of hysterectomy and bilateral salpingo-oophorectomy for endometrial cancer with Dr. Eliu Marin.      Review of Systems   Genitourinary:  Positive for frequency.   All other systems reviewed and are negative.      Objective   Physical Exam  Thyroid: No thyroid megaly    Cardiovascular: Regular rate and rhythm    Lungs: Clear to auscultation    Breasts: No skin changes no masses palpated    Abdomen: Soft nontender bowel sounds positive no masses palpated    Extremities nontender no edema    Pelvic exam: External genitalia Bartholin's urethra and Heppner's are normal.  Vaginal exam shows no lesions or discharge.  Vagina is foreshortened.  Pelvic bimanual exam reveals no masses or tenderness.  Assessment/Plan   Urinary symptoms and incontinence.  Will send urine for culture and treat appropriately    Otherwise normal exam with a remote history of endometrial cancer in 2008 with vaginal cuff recurrence in 2010         Osvaldo Greenberg MD 01/13/25 3:17 PM

## 2025-01-14 LAB — BACTERIA UR CULT: NORMAL

## 2025-02-04 ENCOUNTER — APPOINTMENT (OUTPATIENT)
Dept: PRIMARY CARE | Facility: CLINIC | Age: 89
End: 2025-02-04
Payer: MEDICARE

## 2025-02-04 VITALS — DIASTOLIC BLOOD PRESSURE: 80 MMHG | BODY MASS INDEX: 29.48 KG/M2 | WEIGHT: 156 LBS | SYSTOLIC BLOOD PRESSURE: 140 MMHG

## 2025-02-04 DIAGNOSIS — I10 PRIMARY HYPERTENSION: ICD-10-CM

## 2025-02-04 DIAGNOSIS — Z00.00 HEALTH CARE MAINTENANCE: Primary | ICD-10-CM

## 2025-02-04 DIAGNOSIS — E11.9 TYPE 2 DIABETES MELLITUS WITHOUT COMPLICATION, UNSPECIFIED WHETHER LONG TERM INSULIN USE (MULTI): ICD-10-CM

## 2025-02-04 PROCEDURE — 99213 OFFICE O/P EST LOW 20 MIN: CPT | Performed by: INTERNAL MEDICINE

## 2025-02-04 PROCEDURE — 3079F DIAST BP 80-89 MM HG: CPT | Performed by: INTERNAL MEDICINE

## 2025-02-04 PROCEDURE — 3077F SYST BP >= 140 MM HG: CPT | Performed by: INTERNAL MEDICINE

## 2025-02-04 PROCEDURE — 1123F ACP DISCUSS/DSCN MKR DOCD: CPT | Performed by: INTERNAL MEDICINE

## 2025-02-04 NOTE — PROGRESS NOTES
Subjective   Patient ID: Lisa Thakkar is a 89 y.o. female who presents for No chief complaint on file..    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 Segovia for cardiology review prior laboratory results  (check) continue with same medication watch overall diet for salt and sugar good exercise good water consumption weight stability care and safety in the home recheck 3 months    LOV new or old finish note review

## 2025-02-27 ENCOUNTER — PATIENT OUTREACH (OUTPATIENT)
Dept: PRIMARY CARE | Facility: CLINIC | Age: 89
End: 2025-02-27
Payer: MEDICARE

## 2025-03-26 ENCOUNTER — APPOINTMENT (OUTPATIENT)
Dept: PRIMARY CARE | Facility: CLINIC | Age: 89
End: 2025-03-26
Payer: MEDICARE

## 2025-04-01 DIAGNOSIS — I25.10 CORONARY ARTERY DISEASE INVOLVING NATIVE CORONARY ARTERY OF NATIVE HEART WITHOUT ANGINA PECTORIS: Primary | ICD-10-CM

## 2025-04-01 DIAGNOSIS — E11.9 TYPE 2 DIABETES MELLITUS WITHOUT COMPLICATION, WITHOUT LONG-TERM CURRENT USE OF INSULIN: ICD-10-CM

## 2025-04-01 DIAGNOSIS — E78.5 HYPERLIPIDEMIA: ICD-10-CM

## 2025-04-01 RX ORDER — METFORMIN HYDROCHLORIDE 500 MG/1
500 TABLET ORAL
Qty: 180 TABLET | Refills: 1 | Status: SHIPPED | OUTPATIENT
Start: 2025-04-01 | End: 2025-09-28

## 2025-04-01 RX ORDER — METOPROLOL TARTRATE 50 MG/1
50 TABLET ORAL 2 TIMES DAILY
Qty: 180 TABLET | Refills: 3 | Status: SHIPPED | OUTPATIENT
Start: 2025-04-01 | End: 2026-04-01

## 2025-04-01 RX ORDER — GLIMEPIRIDE 2 MG/1
2 TABLET ORAL 2 TIMES DAILY
Qty: 180 TABLET | Refills: 1 | Status: SHIPPED | OUTPATIENT
Start: 2025-04-01

## 2025-04-01 RX ORDER — SIMVASTATIN 20 MG/1
20 TABLET, FILM COATED ORAL NIGHTLY
Qty: 90 TABLET | Refills: 3 | Status: SHIPPED | OUTPATIENT
Start: 2025-04-01 | End: 2026-04-01

## 2025-04-11 DIAGNOSIS — E11.9 TYPE 2 DIABETES MELLITUS WITHOUT COMPLICATION, WITHOUT LONG-TERM CURRENT USE OF INSULIN: Primary | ICD-10-CM

## 2025-04-12 LAB
ALBUMIN SERPL-MCNC: 4.4 G/DL (ref 3.6–5.1)
ALBUMIN/CREAT UR: 8 MG/G CREAT
ALP SERPL-CCNC: 130 U/L (ref 37–153)
ALT SERPL-CCNC: 24 U/L (ref 6–29)
ANION GAP SERPL CALCULATED.4IONS-SCNC: 11 MMOL/L (CALC) (ref 7–17)
AST SERPL-CCNC: 27 U/L (ref 10–35)
BILIRUB SERPL-MCNC: 0.6 MG/DL (ref 0.2–1.2)
BUN SERPL-MCNC: 23 MG/DL (ref 7–25)
CALCIUM SERPL-MCNC: 10 MG/DL (ref 8.6–10.4)
CHLORIDE SERPL-SCNC: 102 MMOL/L (ref 98–110)
CO2 SERPL-SCNC: 26 MMOL/L (ref 20–32)
CREAT SERPL-MCNC: 1.2 MG/DL (ref 0.6–0.95)
CREAT UR-MCNC: 90 MG/DL (ref 20–275)
EGFRCR SERPLBLD CKD-EPI 2021: 43 ML/MIN/1.73M2
EST. AVERAGE GLUCOSE BLD GHB EST-MCNC: 240 MG/DL
EST. AVERAGE GLUCOSE BLD GHB EST-SCNC: 13.3 MMOL/L
GLUCOSE SERPL-MCNC: 174 MG/DL (ref 65–99)
HBA1C MFR BLD: 10 % OF TOTAL HGB
MICROALBUMIN UR-MCNC: 0.7 MG/DL
POTASSIUM SERPL-SCNC: 4.4 MMOL/L (ref 3.5–5.3)
PROT SERPL-MCNC: 7 G/DL (ref 6.1–8.1)
SODIUM SERPL-SCNC: 139 MMOL/L (ref 135–146)

## 2025-04-15 ENCOUNTER — APPOINTMENT (OUTPATIENT)
Dept: ENDOCRINOLOGY | Facility: CLINIC | Age: 89
End: 2025-04-15

## 2025-05-06 ENCOUNTER — APPOINTMENT (OUTPATIENT)
Dept: PRIMARY CARE | Facility: CLINIC | Age: 89
End: 2025-05-06
Payer: MEDICARE

## 2025-05-06 VITALS — SYSTOLIC BLOOD PRESSURE: 132 MMHG | DIASTOLIC BLOOD PRESSURE: 74 MMHG

## 2025-05-06 DIAGNOSIS — I10 PRIMARY HYPERTENSION: ICD-10-CM

## 2025-05-06 DIAGNOSIS — Z00.00 HEALTH CARE MAINTENANCE: ICD-10-CM

## 2025-05-06 DIAGNOSIS — E11.9 TYPE 2 DIABETES MELLITUS WITHOUT COMPLICATION, UNSPECIFIED WHETHER LONG TERM INSULIN USE: ICD-10-CM

## 2025-05-06 DIAGNOSIS — M65.30 ACQUIRED TRIGGER FINGER: Primary | ICD-10-CM

## 2025-05-06 PROCEDURE — G2211 COMPLEX E/M VISIT ADD ON: HCPCS | Performed by: INTERNAL MEDICINE

## 2025-05-06 PROCEDURE — 99213 OFFICE O/P EST LOW 20 MIN: CPT | Performed by: INTERNAL MEDICINE

## 2025-05-06 PROCEDURE — 3075F SYST BP GE 130 - 139MM HG: CPT | Performed by: INTERNAL MEDICINE

## 2025-05-06 PROCEDURE — 3078F DIAST BP <80 MM HG: CPT | Performed by: INTERNAL MEDICINE

## 2025-05-06 NOTE — PROGRESS NOTES
Subjective   Patient ID: Lisa Thakkar is a 89 y.o. female who presents for No chief complaint on file..    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    Physical Exam  Vital signs noted alert and oriented x 3 NCAT no JVD chest clear to auscultation CV regular rate and rhythm S1-S2 extremities no clubbing cyanosis or edema normal distal pulses musculoskeletal right hand 2 fingers that trigger on the fourth and the third unable to close fist she is right-handed  Assessment/Plan        Impression DJD hand with trigger finger diabetes mellitus hypertension?  Plan review from outside sources the recent blood work for diabetes good diet regular exercise good water consumption and advised on treatment and she may be taking 2 metformin and Amaryl?  Okay for referral to orthopedic hand surgeon requisition made okay for continuing with monitoring of the blood pressure medication and recheck for regular visit after that    Call about diabetes levels with pharmacy  (Check)  RAC

## 2025-05-07 ENCOUNTER — TELEPHONE (OUTPATIENT)
Dept: ORTHOPEDIC SURGERY | Facility: HOSPITAL | Age: 89
End: 2025-05-07

## 2025-05-07 NOTE — TELEPHONE ENCOUNTER
Copied from CRM #[id number]. Topic: Needs Earlier Appointment  >> May 7, 2025  9:52 AM Corine RIGGINS wrote:  Good morning I have a patient on my line that would like to reach out to provider offices to see if she able to get a sooner appt if possible please give patient a call Thank you and have a great day!

## 2025-05-15 ENCOUNTER — APPOINTMENT (OUTPATIENT)
Dept: PHARMACY | Facility: HOSPITAL | Age: 89
End: 2025-05-15
Payer: MEDICARE

## 2025-05-15 DIAGNOSIS — E08.29 DIABETES MELLITUS DUE TO UNDERLYING CONDITION WITH MICROALBUMINURIA, WITHOUT LONG-TERM CURRENT USE OF INSULIN (MULTI): Primary | ICD-10-CM

## 2025-05-15 DIAGNOSIS — R80.9 DIABETES MELLITUS DUE TO UNDERLYING CONDITION WITH MICROALBUMINURIA, WITHOUT LONG-TERM CURRENT USE OF INSULIN (MULTI): Primary | ICD-10-CM

## 2025-05-15 DIAGNOSIS — Z00.00 HEALTH CARE MAINTENANCE: Primary | ICD-10-CM

## 2025-05-15 DIAGNOSIS — Z00.00 HEALTH CARE MAINTENANCE: ICD-10-CM

## 2025-05-15 DIAGNOSIS — E11.65 TYPE 2 DIABETES MELLITUS WITH HYPERGLYCEMIA, WITHOUT LONG-TERM CURRENT USE OF INSULIN: ICD-10-CM

## 2025-05-15 NOTE — PROGRESS NOTES
Clinical Pharmacy Appointment    Patient ID: Lisa Thakkar is a 89 y.o. female who presents for Diabetes.    Pt is here for First appointment.     Referring Provider: Jacky Castellanos MD  PCP: Jacky Castellanos MD   Last visit with PCP: 5/6/25   Next visit with PCP: 7/25/25    Subjective   Medication Reconciliation:  Changed:   Added:   Discontinued: januvia, suppository,     Drug Interactions  No relevant drug interactions were noted.    Medication System Management  Patient's preferred pharmacy: [facility]  Adherence/Organization: reports adherent  Affordability/Accessibility: no issues      HPI  DIABETES MELLITUS TYPE 2:    Does patient follow with Endocrinology: Yes  Last optometry exam: 9/2024  Most recent visit in Podiatry: NA-- patient denies sores or cuts on feet today      Current diabetic medications include:  Glimepiride 2 mg daily in am  Januvia 50 mg daily  Metformin 500mg BID    Clarifications to above regimen: pt reports glimepiride BID causes severe constipation. Scared to start januvia due to SE profile.   Adverse Effects:     Past diabetic medications include:  Pioglitazone - chest pain    Glucose Readings:  Glucometer/CGM Type: glucometer  Patient tests BG 1 times per day    Current home BG readings (mg/dL): 164,171,188,174,174,208,176,190,178,168,152,174,183,175,158,160,168,166,182,169,175,177,189,192,191,188,182,188,196,193- avg 178  Previous home BG readings (mg/dL): NA    Any episodes of hypoglycemia? No,  .  Did patient treat episode of hypoglycemia appropriately? N/A  Does the patient have a prescription for ready-to-use Glucagon? Not on insulin    Lifestyle:  Diet: 3 meals/day. Hot chocolate (sugar free), toast, nuts,    Exercise: NA  NA  Is limited by: not enough sleep comfortably. Sleeps on couch.   Tobacco history: NA     Secondary Prevention:  Statin? Yes  ACE-I/ARB? No  Aspirin? Yes    Pertinent PMH Review:  PMH of Pancreatitis: No  PMH of Retinopathy: No  PMH of Urinary Tract Infections:  Yes- no recurrent  PMH of MTC: No  PMH of MEN2: No  UACR/EGFR in last year?: Yes  ALBUMIN/CREATININE RATIO, RANDOM URINE   Date Value Ref Range Status   04/11/2025 8 <30 mg/g creat Final     Comment:        The ADA defines abnormalities in albumin  excretion as follows:     Albuminuria Category        Result (mg/g creatinine)     Normal to Mildly increased   <30  Moderately increased            Severely increased           > OR = 300     The ADA recommends that at least two of three  specimens collected within a 3-6 month period be  abnormal before considering a patient to be  within a diagnostic category.       Albumin/Creatinine Ratio   Date Value Ref Range Status   10/10/2024 16.5 <30.0 ug/mg Creat Final     Albumin/Creatine Ratio   Date Value Ref Range Status   09/15/2023 35.5 (H) 0.0 - 30.0 ug/mg crt Final         Objective   Allergies[1]  Social History     Social History Narrative    Not on file      Medication Review  Current Outpatient Medications   Medication Instructions    aspirin 81 mg EC tablet 1 tablet, Daily    biotin 5 mg capsule 1 capsule, Daily    blood sugar diagnostic (Prodigy No Coding) strip Test twice daily    cholecalciferol (Vitamin D-3) 25 MCG (1000 UT) tablet 1 tablet, Daily    FreeStyle lancets 28 gauge Test twice a day    glimepiride (AMARYL) 2 mg, oral, 2 times daily    metFORMIN (GLUCOPHAGE) 500 mg, oral, 2 times daily (morning and late afternoon)    metoprolol tartrate (LOPRESSOR) 50 mg, oral, 2 times daily    multivitamin/iron/folic acid (CENTRUM ORAL) 1 tablet, Daily    simvastatin (ZOCOR) 20 mg, oral, Nightly      Vitals  BP Readings from Last 2 Encounters:   05/06/25 132/74   02/04/25 140/80     BMI Readings from Last 1 Encounters:   02/04/25 29.48 kg/m²      Labs  A1C  Lab Results   Component Value Date    HGBA1C 10.0 (H) 04/11/2025    HGBA1C 9.9 (H) 10/10/2024    HGBA1C 9.9 (H) 04/10/2024     BMP  Lab Results   Component Value Date    CALCIUM 10.0 04/11/2025      04/11/2025    K 4.4 04/11/2025    CO2 26 04/11/2025     04/11/2025    BUN 23 04/11/2025    CREATININE 1.20 (H) 04/11/2025    EGFR 43 (L) 04/11/2025     LFTs  Lab Results   Component Value Date    ALT 24 04/11/2025    AST 27 04/11/2025    ALKPHOS 130 04/11/2025    BILITOT 0.6 04/11/2025     FLP  Lab Results   Component Value Date    TRIG 176 (H) 10/10/2024    CHOL 125 10/10/2024    LDLF 38 09/15/2023    LDLCALC 39 10/10/2024    HDL 51.1 10/10/2024     Urine Microalbumin  Lab Results   Component Value Date    MICROALBCREA 8 04/11/2025     Weight Management  Wt Readings from Last 3 Encounters:   02/04/25 70.8 kg (156 lb)   01/13/25 68.9 kg (152 lb)   12/10/24 70.3 kg (155 lb)      There is no height or weight on file to calculate BMI.     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  Is pt at goal? No, 10.0  Patient's SMBGs are 164,171,188,174,174,208,176,190,178,168,152,174,183,175,158,160,168,166,182,169,175,177,189,192,191,188,182,188,196,193- avg 178.     Rationale for plan: Pt reports controlled BG when compared to A1c goal of <8 (set by endo). Pt believes high A1c due to hospital food (had BG consistently in 200-300s). Pt is resistant to any medication changes. Did not start januvia due to SE profile, will not start. Pt stated she has not slept comfortably in years and is sleeping on the couch. Expressed need of help for living condition (does not have caretaker/family except cousin), is able to afford food and basic care. Will ask Dr. Castellanos if social work referral is appropriate. Pts medications are covered fully, no cost issues reported. Discussed lifestyle  modifications. Pt agreed to talk to dietician to create meal  plan. Pt to continue current regimen and focus on stricter diet.    Medication Changes:  CONTINUE  Glimepiride 2 mg daily in am  Metformin 500mg BID    Future Considerations:  Pt will not start any new medications. Will focus on lifestyle.           Other Visit Diagnoses         Health care maintenance                Clinical Pharmacist follow-up: 6/12 3pm    Continue all meds under the continuation of care with the referring provider and clinical pharmacy team.    Thank you,  Donta Crawford PharmD [facility]   Clinical Pharmacy Specialist, Primary Care     Verbal consent to manage patient's drug therapy was obtained from the patient. They were informed they may decline to participate or withdraw from participation in pharmacy services at any time.         [1]   Allergies  Allergen Reactions    Pioglitazone Shortness of breath     Chest pain     Amoxicillin Unknown    Axid [Nizatidine] Unknown    Bactrim [Sulfamethoxazole-Trimethoprim] Unknown    Celebrex [Celecoxib] Unknown    Ciprofloxacin Unknown    Clindamycin Unknown    Cortisone Unknown    Erythromycin Unknown    Hydrocortisone Unknown    Lansoprazole Unknown    Methocarbamol Unknown     Other Reaction(s): Unknown      robaxin    Methylprednisolone Unknown    Niacin Unknown     Other Reaction(s): Unknown    Prevacid Naprapac [Lansoprazole-Naproxen] Unknown    Protonix [Pantoprazole] Unknown    Sulfa (Sulfonamide Antibiotics) Unknown    Welchol [Colesevelam] Unknown

## 2025-05-15 NOTE — ASSESSMENT & PLAN NOTE
Patient's goal A1c is < 8%.  Is pt at goal? No, 10.0  Patient's SMBGs are 164,171,188,174,174,208,176,190,178,168,152,174,183,175,158,160,168,166,182,169,175,177,189,192,191,188,182,188,196,193- avg 178.     Rationale for plan: Pt reports controlled BG when compared to A1c goal of <8 (set by endo). Pt believes high A1c due to hospital food (had BG consistently in 200-300s). Pt is resistant to any medication changes. Did not start januvia due to SE profile, will not start. Pt stated she has not slept comfortably in years and is sleeping on the couch. Expressed need of help for living condition (does not have caretaker/family except cousin), is able to afford food and basic care. Will ask Dr. Castellanos if social work referral is appropriate. Pts medications are covered fully, no cost issues reported. Discussed lifestyle  modifications. Pt agreed to talk to dietician to create meal plan. Pt to continue current regimen and focus on stricter diet.    Medication Changes:  CONTINUE  Glimepiride 2 mg daily in am  Metformin 500mg BID    Future Considerations:  Pt will not start any new medications. Will focus on lifestyle.

## 2025-05-22 ENCOUNTER — PATIENT OUTREACH (OUTPATIENT)
Dept: PRIMARY CARE | Facility: CLINIC | Age: 89
End: 2025-05-22
Payer: MEDICARE

## 2025-05-22 DIAGNOSIS — E11.9 TYPE 2 DIABETES MELLITUS WITHOUT COMPLICATION, UNSPECIFIED WHETHER LONG TERM INSULIN USE: ICD-10-CM

## 2025-05-22 DIAGNOSIS — E78.2 MIXED HYPERLIPIDEMIA: ICD-10-CM

## 2025-05-22 ASSESSMENT — ENCOUNTER SYMPTOMS
OCCASIONAL FEELINGS OF UNSTEADINESS: 0
DEPRESSION: 0
LOSS OF SENSATION IN FEET: 0

## 2025-05-22 ASSESSMENT — ACTIVITIES OF DAILY LIVING (ADL): BATHING: YES

## 2025-05-22 NOTE — PROGRESS NOTES
Patient introduced to Chronic Care Management Services   Patient opted into services on May 22, 2025  Services will be performed under the direction of PCP: Dr. Castellanos  Patient has planned on July 25, 2025  I have discussed the nature and availability of the service with the patient, the patient's responsibility for potential cost sharing, only one practitioner furnishing services during a calendar month, and the right to stop services at any time.     Spoke with Patient and introduced the Chronic Care Management Program and she agreed to enroll. Patient states she still drives and she has one cousin in [place] that she is in contact with. She states she has no children and has never been . Patient checks her blood sugar in the morning before breakfast. Patient states she is not sleeping well due to an uncomfortable mattress. General assessment, ADL and falls screening done. Patient given my contact number to call with any questions or concerns.

## 2025-05-27 ENCOUNTER — PATIENT OUTREACH (OUTPATIENT)
Dept: CARE COORDINATION | Facility: CLINIC | Age: 89
End: 2025-05-27
Payer: MEDICARE

## 2025-05-27 NOTE — PROGRESS NOTES
Called Lisa in regard to nutrition referral. LVM with contact name/number to call back if she would like to schedule an appointment.

## 2025-05-28 ENCOUNTER — APPOINTMENT (OUTPATIENT)
Dept: CARDIOLOGY | Facility: HOSPITAL | Age: 89
End: 2025-05-28
Payer: MEDICARE

## 2025-05-28 ENCOUNTER — OFFICE VISIT (OUTPATIENT)
Dept: CARDIOLOGY | Facility: HOSPITAL | Age: 89
End: 2025-05-28
Payer: MEDICARE

## 2025-05-28 VITALS
SYSTOLIC BLOOD PRESSURE: 132 MMHG | HEART RATE: 73 BPM | WEIGHT: 153.1 LBS | DIASTOLIC BLOOD PRESSURE: 72 MMHG | HEIGHT: 61 IN | BODY MASS INDEX: 28.9 KG/M2 | OXYGEN SATURATION: 98 %

## 2025-05-28 DIAGNOSIS — Z95.1 S/P CABG X 4: ICD-10-CM

## 2025-05-28 DIAGNOSIS — I25.10 CORONARY ARTERY DISEASE INVOLVING NATIVE CORONARY ARTERY OF NATIVE HEART WITHOUT ANGINA PECTORIS: Primary | ICD-10-CM

## 2025-05-28 DIAGNOSIS — E78.5 HYPERLIPIDEMIA, UNSPECIFIED HYPERLIPIDEMIA TYPE: ICD-10-CM

## 2025-05-28 PROCEDURE — 93010 ELECTROCARDIOGRAM REPORT: CPT | Performed by: INTERNAL MEDICINE

## 2025-05-28 PROCEDURE — 99214 OFFICE O/P EST MOD 30 MIN: CPT | Performed by: NURSE PRACTITIONER

## 2025-05-28 PROCEDURE — 1036F TOBACCO NON-USER: CPT | Performed by: NURSE PRACTITIONER

## 2025-05-28 PROCEDURE — 1159F MED LIST DOCD IN RCRD: CPT | Performed by: NURSE PRACTITIONER

## 2025-05-28 PROCEDURE — 99212 OFFICE O/P EST SF 10 MIN: CPT | Performed by: NURSE PRACTITIONER

## 2025-05-28 PROCEDURE — 1160F RVW MEDS BY RX/DR IN RCRD: CPT | Performed by: NURSE PRACTITIONER

## 2025-05-28 PROCEDURE — 93005 ELECTROCARDIOGRAM TRACING: CPT | Performed by: NURSE PRACTITIONER

## 2025-05-28 ASSESSMENT — ENCOUNTER SYMPTOMS
RESPIRATORY NEGATIVE: 1
CONSTITUTIONAL NEGATIVE: 1
CARDIOVASCULAR NEGATIVE: 1

## 2025-05-28 NOTE — PROGRESS NOTES
Subjective   Lisa Thakkar is a 89 y.o. female.    Chief Complaint:  No chief complaint on file.    HPI  Mrs. Thakkar is seen for a 6-month follow-up in collaboration with Dr. Segovia.  She has no particular cardiovascular concerns.  She does say she is tired however has not been able to get good sleep due to her mattress.  She is waiting for a new mattress to be delivered.  She denies any recent hospitalizations or ED visits.  She has been compliant with her medication.  She is established with a new PCP and is quite happy with her choice.  She is receiving diabetic education and has learned quite a bit about her diet.  She denies any symptoms referable to angina.  She does not need any prescription refills.      Review of Systems   Constitutional: Negative.   Cardiovascular: Negative.    Respiratory: Negative.     All other systems reviewed and are negative.      Objective   Vitals reviewed.   Constitutional:       Appearance: Healthy appearance. Not in distress.   Neck:      Vascular: No JVR. JVD normal.   Pulmonary:      Effort: Pulmonary effort is normal.      Breath sounds: Normal breath sounds. No wheezing. No rhonchi. No rales.   Chest:      Chest wall: Not tender to palpatation.   Cardiovascular:      Normal rate. Regular rhythm. Normal S1. Normal S2.       Murmurs: There is no murmur.      No gallop.  No click. No rub.   Pulses:     Intact distal pulses.   Abdominal:      Tenderness: There is no abdominal tenderness.   Musculoskeletal:         General: No tenderness. Skin:     General: Skin is warm and dry.   Neurological:      General: No focal deficit present.      Mental Status: Alert and oriented to person, place and time.         Lab Review:   Lab Results   Component Value Date     04/11/2025    K 4.4 04/11/2025     04/11/2025    CO2 26 04/11/2025    BUN 23 04/11/2025    CREATININE 1.20 (H) 04/11/2025    GLUCOSE 174 (H) 04/11/2025    CALCIUM 10.0 04/11/2025     Lab Results   Component  Value Date    WBC 11.4 (H) 11/30/2024    HGB 12.2 11/30/2024    HCT 36.8 11/30/2024    MCV 86 11/30/2024     11/30/2024     Lab Results   Component Value Date    CHOL 125 10/10/2024    TRIG 176 (H) 10/10/2024    HDL 51.1 10/10/2024     ECG obtained and reviewed, shows sinus rhythm with PAC and right bundle branch block, ventricular rate is 73 bpm      Assessment/Plan   The encounter diagnosis was Coronary artery disease involving native coronary artery of native heart without angina pectoris.  Mrs. Thakkar is a very pleasant 89-year-old white female with a medical history significant for coronary artery disease status post remote CABG surgery, hypertension, dyslipidemia and diabetes mellitus type 2.  She presents rather stable from a cardiovascular standpoint denying any symptoms referable to angina.  She is able to perform all her own ADLs.  Vital signs and ECG are stable.  Recent lipid panel shows an LDL of 39 mg/dL and is at goal.  I would like her to continue her medications unchanged.  She does not need any cardiovascular testing at this time.  She will continue follow-up with her PCP.  She will follow-up with us in another 6 months and knows to call for any interim concerns or questions.

## 2025-05-29 ENCOUNTER — PATIENT OUTREACH (OUTPATIENT)
Dept: CARE COORDINATION | Facility: CLINIC | Age: 89
End: 2025-05-29
Payer: MEDICARE

## 2025-05-29 LAB
ATRIAL RATE: 73 BPM
P AXIS: 41 DEGREES
P OFFSET: 187 MS
P ONSET: 132 MS
PR INTERVAL: 144 MS
Q ONSET: 204 MS
QRS COUNT: 12 BEATS
QRS DURATION: 124 MS
QT INTERVAL: 416 MS
QTC CALCULATION(BAZETT): 458 MS
QTC FREDERICIA: 444 MS
R AXIS: -37 DEGREES
T AXIS: 23 DEGREES
T OFFSET: 412 MS
VENTRICULAR RATE: 73 BPM

## 2025-05-29 NOTE — PROGRESS NOTES
Lisa called back yesterday and lvm as she is interested in setting up an appointment for nutrition. Called her back today and we set up a time for mid-June.

## 2025-06-03 ENCOUNTER — OFFICE VISIT (OUTPATIENT)
Dept: ORTHOPEDIC SURGERY | Facility: HOSPITAL | Age: 89
End: 2025-06-03
Payer: MEDICARE

## 2025-06-03 VITALS — HEIGHT: 61 IN | WEIGHT: 153 LBS | BODY MASS INDEX: 28.89 KG/M2

## 2025-06-03 DIAGNOSIS — M65.331 TRIGGER FINGER, RIGHT MIDDLE FINGER: Primary | ICD-10-CM

## 2025-06-03 DIAGNOSIS — M65.30 ACQUIRED TRIGGER FINGER: ICD-10-CM

## 2025-06-03 PROCEDURE — 99202 OFFICE O/P NEW SF 15 MIN: CPT | Performed by: ORTHOPAEDIC SURGERY

## 2025-06-03 PROCEDURE — 20550 NJX 1 TENDON SHEATH/LIGAMENT: CPT | Mod: RT | Performed by: ORTHOPAEDIC SURGERY

## 2025-06-03 PROCEDURE — 2500000004 HC RX 250 GENERAL PHARMACY W/ HCPCS (ALT 636 FOR OP/ED): Performed by: ORTHOPAEDIC SURGERY

## 2025-06-03 RX ORDER — LIDOCAINE HYDROCHLORIDE 10 MG/ML
0.5 INJECTION, SOLUTION INFILTRATION; PERINEURAL
Status: COMPLETED | OUTPATIENT
Start: 2025-06-03 | End: 2025-06-03

## 2025-06-03 RX ORDER — TRIAMCINOLONE ACETONIDE 40 MG/ML
20 INJECTION, SUSPENSION INTRA-ARTICULAR; INTRAMUSCULAR
Status: COMPLETED | OUTPATIENT
Start: 2025-06-03 | End: 2025-06-03

## 2025-06-03 RX ADMIN — TRIAMCINOLONE ACETONIDE 20 MG: 40 INJECTION, SUSPENSION INTRA-ARTICULAR; INTRAMUSCULAR at 19:50

## 2025-06-03 RX ADMIN — LIDOCAINE HYDROCHLORIDE 0.5 ML: 10 INJECTION, SOLUTION INFILTRATION; PERINEURAL at 19:50

## 2025-06-03 ASSESSMENT — PAIN DESCRIPTION - DESCRIPTORS: DESCRIPTORS: ACHING;SORE

## 2025-06-03 ASSESSMENT — PAIN - FUNCTIONAL ASSESSMENT: PAIN_FUNCTIONAL_ASSESSMENT: 0-10

## 2025-06-12 ENCOUNTER — APPOINTMENT (OUTPATIENT)
Dept: PHARMACY | Facility: HOSPITAL | Age: 89
End: 2025-06-12
Payer: MEDICARE

## 2025-06-12 DIAGNOSIS — E08.29 DIABETES MELLITUS DUE TO UNDERLYING CONDITION WITH MICROALBUMINURIA, WITHOUT LONG-TERM CURRENT USE OF INSULIN (MULTI): Primary | ICD-10-CM

## 2025-06-12 DIAGNOSIS — R80.9 DIABETES MELLITUS DUE TO UNDERLYING CONDITION WITH MICROALBUMINURIA, WITHOUT LONG-TERM CURRENT USE OF INSULIN (MULTI): Primary | ICD-10-CM

## 2025-06-12 NOTE — ASSESSMENT & PLAN NOTE
Patient's goal A1c is < 8%.  Is pt at goal? No, 10  Patient's SMBGs are 170,177,174,154,176,145,117,167,129,172,178,170,191,187,177,170,175,178,212,215,180,194,176,186,164,171- avg 173 .     Rationale for plan: pt reported improved BG compared to last visit. Per endo, pts goal BG <200 and A1c<8. Pts avg glucose comparable to A1c less than 8. Pt to continue current therapy. Pt complained of burning sensation in both legs, worsens when laying down. Pt will notify Dr. Castellanos after todays appt.     Medication Changes:  CONTINUE  Glimepiride 2 mg daily in am  Metformin 500mg BID

## 2025-06-12 NOTE — PROGRESS NOTES
Clinical Pharmacy Appointment    Patient ID: Lisa Thakkar is a 89 y.o. female who presents for Diabetes.    Pt is here for Follow Up appointment.     Referring Provider: Jacky Castellanos MD  PCP: Jacky Castellanos MD   Last visit with PCP: 5/6/25   Next visit with PCP: 7/25/25    Subjective   Medication Reconciliation:  Changed:   Added:   Discontinued: januvia, suppository,     Drug Interactions  No relevant drug interactions were noted.    Medication System Management  Patient's preferred pharmacy: [facility]  Adherence/Organization: reports adherent  Affordability/Accessibility: no issues      HPI  DIABETES MELLITUS TYPE 2:    Does patient follow with Endocrinology: Yes  Last optometry exam: 9/2024  Most recent visit in Podiatry: NA-- patient denies sores or cuts on feet today      Current diabetic medications include:  Glimepiride 2 mg daily in am  Metformin 500mg BID    Clarifications to above regimen: pt reports glimepiride BID causes severe constipation. Scared to start januvia due to SE profile.   Adverse Effects:     Past diabetic medications include:  Pioglitazone - chest pain    Glucose Readings:  Glucometer/CGM Type: glucometer  Patient tests BG 1 times per day    Current home BG readings (mg/dL): 170,177,174,154,176,145,117,167,129,172,178,170,191,187,177,170,175,178,212,215,180,194,176,186,164,171- avg 173   Previous home BG readings (mg/dL): 164,171,188,174,174,208,176,190,178,168,152,174,183,175,158,160,168,166,182,169,175,177,189,192,191,188,182,188,196,193- avg 178    Any episodes of hypoglycemia? No,  .  Did patient treat episode of hypoglycemia appropriately? N/A  Does the patient have a prescription for ready-to-use Glucagon? Not on insulin    Lifestyle:  Diet: 3 meals/day. Hot chocolate (sugar free), toast, nuts,    Exercise: NA  NA  Is limited by: not enough sleep comfortably. Sleeps on couch.   Tobacco history: NA     Secondary Prevention:  Statin? Yes  ACE-I/ARB? No  Aspirin? Yes    Pertinent  PMH Review:  PMH of Pancreatitis: No  PMH of Retinopathy: No  PMH of Urinary Tract Infections: Yes- no recurrent  PMH of MTC: No  PMH of MEN2: No  UACR/EGFR in last year?: Yes  ALBUMIN/CREATININE RATIO, RANDOM URINE   Date Value Ref Range Status   04/11/2025 8 <30 mg/g creat Final     Comment:        The ADA defines abnormalities in albumin  excretion as follows:     Albuminuria Category        Result (mg/g creatinine)     Normal to Mildly increased   <30  Moderately increased            Severely increased           > OR = 300     The ADA recommends that at least two of three  specimens collected within a 3-6 month period be  abnormal before considering a patient to be  within a diagnostic category.       Albumin/Creatinine Ratio   Date Value Ref Range Status   10/10/2024 16.5 <30.0 ug/mg Creat Final     Albumin/Creatine Ratio   Date Value Ref Range Status   09/15/2023 35.5 (H) 0.0 - 30.0 ug/mg crt Final         Objective   Allergies[1]  Social History     Social History Narrative    Not on file      Medication Review  Current Outpatient Medications   Medication Instructions    aspirin 81 mg EC tablet 1 tablet, Daily    biotin 5 mg capsule 1 capsule, Daily    blood sugar diagnostic (Prodigy No Coding) strip Test twice daily    cholecalciferol (Vitamin D-3) 25 MCG (1000 UT) tablet 1 tablet, Daily    FreeStyle lancets 28 gauge Test twice a day    glimepiride (AMARYL) 2 mg, oral, 2 times daily    metFORMIN (GLUCOPHAGE) 500 mg, oral, 2 times daily (morning and late afternoon)    metoprolol tartrate (LOPRESSOR) 50 mg, oral, 2 times daily    multivitamin/iron/folic acid (CENTRUM ORAL) 1 tablet, Daily    simvastatin (ZOCOR) 20 mg, oral, Nightly      Vitals  BP Readings from Last 2 Encounters:   05/28/25 132/72   05/06/25 132/74     BMI Readings from Last 1 Encounters:   06/03/25 28.91 kg/m²      Labs  A1C  Lab Results   Component Value Date    HGBA1C 10.0 (H) 04/11/2025    HGBA1C 9.9 (H) 10/10/2024    HGBA1C 9.9 (H)  04/10/2024     BMP  Lab Results   Component Value Date    CALCIUM 10.0 04/11/2025     04/11/2025    K 4.4 04/11/2025    CO2 26 04/11/2025     04/11/2025    BUN 23 04/11/2025    CREATININE 1.20 (H) 04/11/2025    EGFR 43 (L) 04/11/2025     LFTs  Lab Results   Component Value Date    ALT 24 04/11/2025    AST 27 04/11/2025    ALKPHOS 130 04/11/2025    BILITOT 0.6 04/11/2025     FLP  Lab Results   Component Value Date    TRIG 176 (H) 10/10/2024    CHOL 125 10/10/2024    LDLF 38 09/15/2023    LDLCALC 39 10/10/2024    HDL 51.1 10/10/2024     Urine Microalbumin  Lab Results   Component Value Date    MICROALBCREA 8 04/11/2025     Weight Management  Wt Readings from Last 3 Encounters:   06/03/25 69.4 kg (153 lb)   05/28/25 69.4 kg (153 lb 1.6 oz)   02/04/25 70.8 kg (156 lb)      There is no height or weight on file to calculate BMI.     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  Is pt at goal? No, 10  Patient's SMBGs are 170,177,174,154,176,145,117,167,129,172,178,170,191,187,177,170,175,178,212,215,180,194,176,186,164,171- avg 173 .     Rationale for plan: pt reported improved BG compared to last visit. Per endo, pts goal BG <200 and A1c<8. Pts avg glucose comparable to A1c less than 8. Pt to continue current therapy. Pt complained of burning sensation in both legs, worsens when laying down. Pt will notify Dr. Castellanos after todays appt.     Medication Changes:  CONTINUE  Glimepiride 2 mg daily in am  Metformin 500mg BID              Clinical Pharmacist follow-up: 8/14 3pm    Continue all meds under the continuation of care with the referring provider and clinical pharmacy team.    Thank you,  Donta Crawford PharmD [facility]   Clinical Pharmacy Specialist, Primary Care     Verbal consent to manage patient's drug therapy was obtained from the  patient. They were informed they may decline to participate or withdraw from participation in pharmacy services at any time.         [1]   Allergies  Allergen Reactions    Pioglitazone Shortness of breath     Chest pain     Amoxicillin Unknown    Axid [Nizatidine] Unknown    Bactrim [Sulfamethoxazole-Trimethoprim] Unknown    Celebrex [Celecoxib] Unknown    Ciprofloxacin Unknown    Clindamycin Unknown    Cortisone Unknown    Erythromycin Unknown    Hydrocortisone Unknown    Lansoprazole Unknown    Methocarbamol Unknown     Other Reaction(s): Unknown      robaxin    Methylprednisolone Unknown    Niacin Unknown     Other Reaction(s): Unknown    Prevacid Naprapac [Lansoprazole-Naproxen] Unknown    Protonix [Pantoprazole] Unknown    Sulfa (Sulfonamide Antibiotics) Unknown    Welchol [Colesevelam] Unknown

## 2025-06-16 ENCOUNTER — APPOINTMENT (OUTPATIENT)
Dept: CARE COORDINATION | Facility: CLINIC | Age: 89
End: 2025-06-16
Payer: MEDICARE

## 2025-06-16 DIAGNOSIS — R80.9 DIABETES MELLITUS DUE TO UNDERLYING CONDITION WITH MICROALBUMINURIA, WITHOUT LONG-TERM CURRENT USE OF INSULIN (MULTI): ICD-10-CM

## 2025-06-16 DIAGNOSIS — E08.29 DIABETES MELLITUS DUE TO UNDERLYING CONDITION WITH MICROALBUMINURIA, WITHOUT LONG-TERM CURRENT USE OF INSULIN (MULTI): ICD-10-CM

## 2025-06-16 NOTE — PROGRESS NOTES
INITIAL DIABETES EDUCATION VISIT    Reason for Nutrition Visit:  Pt is a 89 y.o. female being seen Virtual, as phone only referred for diabetes management    Past Medical Hx:  Problem List[1]     Diabetes related History:  Type of Diabetes: Type 2   What year were you diagnosed with diabetes? Lisa says she has had diabetes a long time  Family History:  Have you had diabetes education in the past? Did not ask  What Concerns you most about having diabetes? Lisa likes the way she is doing things and states her numbers have come down once she stopped eating bananas and cereal    Health Status:  Comorbidities/Chronica Complications: Comorbidities/Chronic Complications: hypertension, hyperlipidemia, and cardiovascular disease    Diabetes Self-Management   Skills and Behaviors:   Fasting BG range: 117 -181 most recentlymg/dl  Postprandial BG range: does not chekc      Current Medications:   Medications Ordered Prior to Encounter[2]  Diabetes Medications: oral agents  Monitorng: blood glucose meter: she checks am fasting 164, 161, 181, 178, 170, 154, 176, 145, 117, 129, 167, 170, 191, 212, 194    Hypoglycemia: None  Hypoglycemia Treatment:      Labs:  Lab Results   Component Value Date    HGBA1C 10.0 (H) 04/11/2025    HGBA1C 9.9 (H) 10/10/2024    HGBA1C 9.9 (H) 04/10/2024     04/11/2025    K 4.4 04/11/2025     04/11/2025    CO2 26 04/11/2025    BUN 23 04/11/2025    CREATININE 1.20 (H) 04/11/2025    CALCIUM 10.0 04/11/2025    ALBUMIN 4.4 04/11/2025    PROT 7.0 04/11/2025    BILITOT 0.6 04/11/2025    ALKPHOS 130 04/11/2025    ALT 24 04/11/2025    AST 27 04/11/2025    GLUCOSE 174 (H) 04/11/2025     Lab Results   Component Value Date    CHOL 125 10/10/2024    LDLCALC 39 10/10/2024    TRIG 176 (H) 10/10/2024    HDL 51.1 10/10/2024    LDLF 38 09/15/2023        Comments:     CMP:    Lipid panel:    Vitamin D:    A1C: current is 10% goal is <8%    Anthropometrics:   Ht Readings from Last 1 Encounters:   06/03/25 1.549 m  "(5' 1\")      BMI Readings from Last 1 Encounters:   06/03/25 28.91 kg/m²      Wt Readings from Last 10 Encounters:   06/03/25 69.4 kg (153 lb)   05/28/25 69.4 kg (153 lb 1.6 oz)   02/04/25 70.8 kg (156 lb)   01/13/25 68.9 kg (152 lb)   12/10/24 70.3 kg (155 lb)   11/28/24 72.1 kg (158 lb 14.4 oz)   11/26/24 71.7 kg (158 lb)   07/24/24 71.4 kg (157 lb 4.8 oz)   03/28/24 69.9 kg (154 lb 3.2 oz)   03/19/24 71.2 kg (157 lb)      Weight change:        DSME - Meal Planning and Diet Recall  Are you currently following any meal plan? Watches carbs    Does your culture or Restorationist require any of the following:   Who does the grocery shopping? patient  Who does the cooking in the home? patient      Appetite: Normal     24 Diet Recall:  Meal 1: SF hot chocolate made with water, slice of toast or mini bagel, 4 cherries, nuts  Meal 2: veal bologna slice, tomato  Meal 3:  she cooks pork chop, baked potato, frozen vegetable or hamburger or stuffed pepper  Snacks:  she does not snack  Beverages: water, diet pop occasionally, small glass chocolate milk once in a while  Eating out: none as she states she sometimes has to have BM or diarrhea with sudden onset. She says it's been a long time with this    Self-Identified Challenges: what to eat - answered her questions    Visit Summary:   Lisa briefly shared her health and diabetes history. She cooks all her own meals and limits going out to only the store or her doctor appointments due to urgency with BM that can occur. She takes Metformin 500 mg 2x/day and Glimepiride 2mg 1x/day. It is prescribed for 2x/day, but Lisa said it made her go to the bathroom so she only takes it once a day now. Overall, her diet looks good with breakfast being the higher carb meal. She has stopped eating cereal and bananas and says that has helped her numbers.  We talked about her working with pharmacist to discuss potential for Metformin ER as that may help her be able to be more comfortable going out. " This educator will message Lotus Razo (pharmacist) to let her know we discussed this today. Addressed her nutrition questions and for now she prefers to wait until the next A1C, continue with same plan, and will consider Metformin ER to see if it helps her with the urgent BM so she can go out more comfortably.    Nutrition Diagnosis:  Diagnosis Statement 1:  Diagnosis Status: New  Diagnosis : Altered nutrition related lab values  related to limited adjustment for lifestyle changes and decreased metabolism (eg, aging)  as evidenced by conditions associated with a chronic condition (DM, HTN, CAD, ED, DE, GI, etc.) and elevated plasma glucose and/or HgbA1c levels     Nutrition Interventions:  Provided nutrition education on the following topic(s): Plate Method using ACONutritionCounseling: Motivational Interviewing.    DSME Topics  Routine Health Assessment and Labs  Taking Medications as Prescribed  Review Glycemic Goals (CGM or SMBG)  Healthy Meal Plan    Nutrition Goals:  Nutrition Goals : Consistent meal/snack pattern  Maintain stable weight  Normalized fasting and postprandial blood glucose  Reduce Hgb A1c      Follow up Plan  Will follow-up x 6 wks         [1]   Patient Active Problem List  Diagnosis    Acquired trigger finger    Acute urinary tract infection    AD (atopic dermatitis)    Allergic rhinitis    Arthritis of hand, left, degenerative    Arthritis of hand, right, degenerative    CAD (coronary artery disease)    Chest pain    Chronic GERD    Chronic low back pain    Chronic right-sided low back pain with right-sided sciatica    Chronic non-infective otitis externa of left ear    Chronic renal insufficiency    CKD (chronic kidney disease) stage 3, GFR 30-59 ml/min (Multi)    Cutaneous skin tags    Dark stools    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, without long-term current use of insulin (Multi)    Edema    Fall from steps    Fatigue    Flexor tenosynovitis of finger     Hair loss    Hand edema    Hematuria    Hemorrhoids    Hyperlipidemia    Influenza A    Inflamed nasal mucosa    Injury of ring finger    Intertrigo    Itching of ear    Limb pain    Loose stools    Nephrolithiasis    Contact dermatitis    Hydronephrosis of right kidney    OAB (overactive bladder)    Osteopenia    Pain of right hand    Paronychia, finger    Renal insufficiency    Right ureteral stone    S/P CABG x 4    Epiphora of right side    Tearing eyes    Age-related nuclear cataract of left eye    Age-related nuclear cataract of right eye    Cortical age-related cataract of left eye    Cortical age-related cataract of right eye    Posterior subcapsular polar age-related cataract of left eye    Traumatic cataract of left eye    Ureteral stricture, right    Urinary incontinence    Urinary urgency    Vaginal itching    Vitamin B12 deficiency    Vitamin D deficiency    Type 2 diabetes mellitus    Class 1 obesity with serious comorbidity and body mass index (BMI) of 30.0 to 30.9 in adult    Left leg swelling    Pyelonephritis    Nonproliferative diabetic retinopathy    Peripheral edema    Posterior vitreous detachment    Leukocytosis    Glucosuria    Hydroureteronephrosis   [2]   Current Outpatient Medications on File Prior to Visit   Medication Sig Dispense Refill    aspirin 81 mg EC tablet Take 1 tablet (81 mg) by mouth once daily.      biotin 5 mg capsule Take 1 capsule (5 mg) by mouth once daily.      blood sugar diagnostic (Prodigy No Coding) strip Test twice daily      cholecalciferol (Vitamin D-3) 25 MCG (1000 UT) tablet Take 1 tablet (25 mcg) by mouth once daily.      FreeStyle lancets 28 gauge Test twice a day      glimepiride (Amaryl) 2 mg tablet TAKE ONE TABLET BY MOUTH TWICE A  tablet 1    metFORMIN (Glucophage) 500 mg tablet Take 1 tablet (500 mg) by mouth 2 times daily (morning and late afternoon). 180 tablet 1    metoprolol tartrate (Lopressor) 50 mg tablet Take 1 tablet by mouth 2 times a  day. 180 tablet 3    multivitamin/iron/folic acid (CENTRUM ORAL) Take 1 tablet by mouth 1 (one) time each day. (Centrum Adult Oral Tablet)      simvastatin (Zocor) 20 mg tablet Take 1 tablet (20 mg) by mouth once daily at bedtime. 90 tablet 3     No current facility-administered medications on file prior to visit.

## 2025-06-18 ENCOUNTER — PATIENT OUTREACH (OUTPATIENT)
Dept: PRIMARY CARE | Facility: CLINIC | Age: 89
End: 2025-06-18
Payer: MEDICARE

## 2025-06-18 DIAGNOSIS — E11.9 TYPE 2 DIABETES MELLITUS WITHOUT COMPLICATION, UNSPECIFIED WHETHER LONG TERM INSULIN USE: ICD-10-CM

## 2025-06-18 DIAGNOSIS — E78.2 MIXED HYPERLIPIDEMIA: ICD-10-CM

## 2025-06-18 NOTE — PROGRESS NOTES
Care Management Monthly Outreach  Chart review completed  Confirmation of at least 2 patient identifiers: Name and   Change in insurance? No    Has patient been to ER/Urgent Care since last outreach? No    Last Office Visit with PCP: Visit date not found   Next Office Visit with PCP: Visit date not found   APC Collaboration: Pharmacy    Chronic Conditions and Outreach Summary:   Type 2 diabetes mellitus without complication, unspecified whether long term insulin use    Mixed hyperlipidemia    Spoke with Patient and monthly outreach completed. Patient states she spoke with someone a few days ago regarding diabetes education. Patient states her blood sugar was 142 this morning and 178 the day before. Patient states her blood sugar has come down a little since she stopped eating bananas. Patient with complaints of diarrhea and states she has been on Metformin for years and it may be the cause. Patient also states she has urinary incontinence and when she stands up the urine just comes out. Patient states he saw a urologist in the past and she didn't like the medications that was prescribed because it made her mouth really dry. Encouraged Pt. to have a discussion with her PCP regarding the these issues.  Patient denies any chest pain or shortness of breath. She saw ortho for her trigger finger and states her right middle finger is doing fine after her injection. Patient states last week she had a burning sensation to her legs, left more than the right but it has improved.     Medications:   Are there medication changes since last visit? No  Refills needed? No    Social Drivers of Health: Deferred  Care Gaps Addressed? Deferred  Care Plan addressed: Yes    Upcoming Appointments:   Future Appointments       Date / Time Provider Department Dept Phone    2025 4:00 PM (Arrive by 3:45 PM) Jacky Castellanos MD [facility] [phone]    2025 2:00 PM Maya T[address]ina RD  [facility]  [phone]    8/14/2025 3:00 PM (Arrive by 2:45 PM) Lotus Agarwal PharmD [facility]  Arrive at: Your Home [phone]    9/15/2025 1:00 PM Andrés Treviño MD [facility] [phone]    12/3/2025 3:00 PM Kelvin Segovia MD [facility] [phone]    1/16/2026 2:30 PM Osvaldo Greenberg MD  [facility] [phone]          Blood Pressures Reviewed  BP Readings from Last 3 Encounters:   05/28/25 132/72   05/06/25 132/74   02/04/25 140/80     Labs Reviewed:  Lab Results   Component Value Date    CREATININE 1.20 (H) 04/11/2025    GLUCOSE 174 (H) 04/11/2025    ALKPHOS 130 04/11/2025    K 4.4 04/11/2025    PROT 7.0 04/11/2025     04/11/2025    CALCIUM 10.0 04/11/2025    AST 27 04/11/2025    ALT 24 04/11/2025    BUN 23 04/11/2025    PHOS 3.9 07/01/2020    GFRF 42 (A) 09/15/2023     Lab Results   Component Value Date    TRIG 176 (H) 10/10/2024    CHOL 125 10/10/2024    LDLCALC 39 10/10/2024    HDL 51.1 10/10/2024     Lab Results   Component Value Date    HGBA1C 10.0 (H) 04/11/2025    HGBA1C 9.9 (H) 10/10/2024    HGBA1C 9.9 (H) 04/10/2024     Lab Results   Component Value Date    WBC 11.4 (H) 11/30/2024    RBC 4.26 11/30/2024    HGB 12.2 11/30/2024     11/30/2024   No other concerns at this time.  Agreeable to continue monthly outreaches.  Encouraged to call if questions or concerns arise.    Omaira Adams RN

## 2025-07-02 ENCOUNTER — TELEPHONE (OUTPATIENT)
Dept: PHARMACY | Facility: HOSPITAL | Age: 89
End: 2025-07-02
Payer: MEDICARE

## 2025-07-02 NOTE — TELEPHONE ENCOUNTER
Population Health: Outreach by Ambulatory Pharmacy Team    Patient: Lisa Thakkar  Primary Care Provider (PCP): Jacky Castellanos MD  Payor: Kathy SANTOS  Reason: Adherence  Medication(s): Metformin, Simvastatin  Outcome: Left Voicemail    BARBY OSEI

## 2025-07-15 ENCOUNTER — PATIENT OUTREACH (OUTPATIENT)
Dept: PRIMARY CARE | Facility: CLINIC | Age: 89
End: 2025-07-15
Payer: MEDICARE

## 2025-07-15 DIAGNOSIS — E78.2 MIXED HYPERLIPIDEMIA: ICD-10-CM

## 2025-07-15 DIAGNOSIS — E11.9 TYPE 2 DIABETES MELLITUS WITHOUT COMPLICATION, UNSPECIFIED WHETHER LONG TERM INSULIN USE: ICD-10-CM

## 2025-07-15 NOTE — PROGRESS NOTES
Care Management Monthly Outreach  Chart review completed  Confirmation of at least 2 patient identifiers  Change in insurance? No    Has patient been to ER/Urgent Care since last outreach? No    Last Office Visit with PCP: 05/06/2025   Next Office Visit with PCP: 07/25/2025  APC Collaboration: Pharmacy    Chronic Conditions and Outreach Summary:   Type 2 diabetes mellitus without complication, unspecified whether long term insulin use    Mixed hyperlipidemia    Spoke with Patient and monthly outreach completed. Patient states a lot of things have gone wrong. She states her air conditioner in her car isn't working and a few tiles have fallen.  Patient does have a working air conditioner in her home and when it gets cold she puts on a sweater because she is afraid if she turns it off it won't come back on. Patient saw the Nutritionist on 06/16/25 for diabetic management. Patient states her blood sugar this morning was 191 and yesterday it was 175. Patient states her blood sugars are up because she ate more than she usually does. She denies any falls and states she is careful when she walks. Patient states she has arranged her ride for her upcoming appointment with Dr. Castellanos on 07/25/25. Patient states she is worried they will take a long time picking her back up from her appointment. Pt. to call to make sure they will pick her up in a timely fashion. Appointments reviewed and Patient to call with any questions or concerns.    Medications:   Are there medication changes since last visit? No  Refills needed? No    Social Drivers of Health: Deferred  Care Gaps Addressed? Deferred  Care Plan addressed: Yes    Upcoming Appointments:   Future Appointments       Date / Time Provider Department Dept Phone    7/25/2025 4:00 PM (Arrive by 3:45 PM) [facility] MD [facility] [phone]    7/31/2025 2:00 PM Maya Tijerina RD  [facility] [phone]    8/14/2025 3:00 PM (Arrive by 2:45 PM)  Lotus Agarwal, PharmD [facility]  Arrive at: Your Home [phone]    9/15/2025 1:00 PM Andrés Treviño MD [facility] [phone]    12/3/2025 3:00 PM Kelvin Segovia MD [facility] [phone]    1/16/2026 2:30 PM Osvaldo Greenberg MD  [facility] [phone]          Blood Pressures Reviewed  BP Readings from Last 3 Encounters:   05/28/25 132/72   05/06/25 132/74   02/04/25 140/80     Labs Reviewed:  Lab Results   Component Value Date    CREATININE 1.20 (H) 04/11/2025    GLUCOSE 174 (H) 04/11/2025    ALKPHOS 130 04/11/2025    K 4.4 04/11/2025    PROT 7.0 04/11/2025     04/11/2025    CALCIUM 10.0 04/11/2025    AST 27 04/11/2025    ALT 24 04/11/2025    BUN 23 04/11/2025    PHOS 3.9 07/01/2020    GFRF 42 (A) 09/15/2023     Lab Results   Component Value Date    TRIG 176 (H) 10/10/2024    CHOL 125 10/10/2024    LDLCALC 39 10/10/2024    HDL 51.1 10/10/2024     Lab Results   Component Value Date    HGBA1C 10.0 (H) 04/11/2025    HGBA1C 9.9 (H) 10/10/2024    HGBA1C 9.9 (H) 04/10/2024     Lab Results   Component Value Date    WBC 11.4 (H) 11/30/2024    RBC 4.26 11/30/2024    HGB 12.2 11/30/2024     11/30/2024   No other concerns at this time.  Agreeable to continue monthly outreaches.  Encouraged to call if questions or concerns arise.    Omaira Adams RN

## 2025-07-17 ENCOUNTER — TELEPHONE (OUTPATIENT)
Dept: PRIMARY CARE | Facility: CLINIC | Age: 89
End: 2025-07-17

## 2025-07-18 ENCOUNTER — TELEPHONE (OUTPATIENT)
Dept: PRIMARY CARE | Facility: CLINIC | Age: 89
End: 2025-07-18
Payer: MEDICARE

## 2025-07-19 DIAGNOSIS — E11.9 TYPE 2 DIABETES MELLITUS WITHOUT COMPLICATION, UNSPECIFIED WHETHER LONG TERM INSULIN USE: Primary | ICD-10-CM

## 2025-07-25 ENCOUNTER — APPOINTMENT (OUTPATIENT)
Dept: PRIMARY CARE | Facility: CLINIC | Age: 89
End: 2025-07-25
Payer: MEDICARE

## 2025-07-25 VITALS
SYSTOLIC BLOOD PRESSURE: 132 MMHG | BODY MASS INDEX: 28.91 KG/M2 | WEIGHT: 153 LBS | RESPIRATION RATE: 12 BRPM | DIASTOLIC BLOOD PRESSURE: 74 MMHG | OXYGEN SATURATION: 97 % | HEART RATE: 78 BPM

## 2025-07-25 DIAGNOSIS — Z00.00 HEALTH CARE MAINTENANCE: ICD-10-CM

## 2025-07-25 DIAGNOSIS — R19.7 DIARRHEA, UNSPECIFIED TYPE: ICD-10-CM

## 2025-07-25 DIAGNOSIS — E78.2 MIXED HYPERLIPIDEMIA: ICD-10-CM

## 2025-07-25 DIAGNOSIS — E11.9 TYPE 2 DIABETES MELLITUS WITHOUT COMPLICATION, UNSPECIFIED WHETHER LONG TERM INSULIN USE: Primary | ICD-10-CM

## 2025-07-25 DIAGNOSIS — R60.0 PERIPHERAL EDEMA: ICD-10-CM

## 2025-07-25 DIAGNOSIS — I10 PRIMARY HYPERTENSION: ICD-10-CM

## 2025-07-25 LAB
EST. AVERAGE GLUCOSE BLD GHB EST-MCNC: 226 MG/DL
EST. AVERAGE GLUCOSE BLD GHB EST-SCNC: 12.5 MMOL/L
HBA1C MFR BLD: 9.5 %

## 2025-07-25 PROCEDURE — 3075F SYST BP GE 130 - 139MM HG: CPT | Performed by: INTERNAL MEDICINE

## 2025-07-25 PROCEDURE — G0439 PPPS, SUBSEQ VISIT: HCPCS | Performed by: INTERNAL MEDICINE

## 2025-07-25 PROCEDURE — 1159F MED LIST DOCD IN RCRD: CPT | Performed by: INTERNAL MEDICINE

## 2025-07-25 PROCEDURE — 1160F RVW MEDS BY RX/DR IN RCRD: CPT | Performed by: INTERNAL MEDICINE

## 2025-07-25 PROCEDURE — 3078F DIAST BP <80 MM HG: CPT | Performed by: INTERNAL MEDICINE

## 2025-07-25 PROCEDURE — 99214 OFFICE O/P EST MOD 30 MIN: CPT | Performed by: INTERNAL MEDICINE

## 2025-07-25 PROCEDURE — 1170F FXNL STATUS ASSESSED: CPT | Performed by: INTERNAL MEDICINE

## 2025-07-25 PROCEDURE — 99397 PER PM REEVAL EST PAT 65+ YR: CPT | Performed by: INTERNAL MEDICINE

## 2025-07-25 PROCEDURE — 1036F TOBACCO NON-USER: CPT | Performed by: INTERNAL MEDICINE

## 2025-07-25 NOTE — PROGRESS NOTES
Subjective   Patient ID: Lisa Thakkar is a 89 y.o. female who presents for No chief complaint on file..    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 no etoh    Family history mother lived a long life    Prevention some exercise some prior blood work reviewed no longer having colonoscopy or mammogram follows up with cardiology    Depression screen not depressed    Review of Systems    Objective   There were no vitals taken for this visit.    Physical Exam    Assessment/Plan    vital signs noted alert and oriented x 3 NCAT PERRLA EOMI nares without discharge OP benign TM normal bilateral EAC clear bilateral no AC nodes no lid lag no thyromegaly no pallor no JVD chest clear to auscultation and percussion cor regular rate and rhythm S1-S2 without murmur gallop or rub abdomen soft nontender normal active bowel sounds extremities no clubbing cyanosis bilateral trace edema mostly nonpitting venous stasis intact distal pulses DTR 1+     Impression General Medical examination peripheral edema fatigue hypertension other diagnoses DM diarrhea hyperlipidemia  Plan review prior laboratory results would like a full blood work panel check CBC advised on blood count check lipid panel advised on cholesterol profile check TSH advised on thyroid function and swelling advised on same and enter in any blood work that she will get  next week may change the metformin to metformin XR advised on the glimepiride dosing (taking once per day, will leave)  follow-up with pharmacy and cardiology discussed with colonoscopy even diagnostically not interested good diet regular exercise good water consumption recheck 3 months based on above TT 50 cc 26    Put in medication change metformin to metformin XR advised on Amaryl (see above)  RAC

## 2025-07-26 RX ORDER — METFORMIN HYDROCHLORIDE 500 MG/1
500 TABLET, EXTENDED RELEASE ORAL
Qty: 30 TABLET | Refills: 2 | Status: SHIPPED
Start: 2025-07-26 | End: 2025-07-26

## 2025-07-26 RX ORDER — METFORMIN HYDROCHLORIDE 500 MG/1
500 TABLET, EXTENDED RELEASE ORAL
Qty: 30 TABLET | Refills: 2 | Status: SHIPPED | OUTPATIENT
Start: 2025-07-26 | End: 2025-10-24

## 2025-07-26 RX ORDER — METFORMIN HYDROCHLORIDE 500 MG/1
500 TABLET, EXTENDED RELEASE ORAL
Qty: 30 TABLET | Refills: 2 | Status: SHIPPED
Start: 2025-07-26 | End: 2025-07-26 | Stop reason: ALTCHOICE

## 2025-07-26 ASSESSMENT — ENCOUNTER SYMPTOMS
OCCASIONAL FEELINGS OF UNSTEADINESS: 0
LOSS OF SENSATION IN FEET: 0
DEPRESSION: 0

## 2025-07-26 ASSESSMENT — PATIENT HEALTH QUESTIONNAIRE - PHQ9
SUM OF ALL RESPONSES TO PHQ9 QUESTIONS 1 AND 2: 0
1. LITTLE INTEREST OR PLEASURE IN DOING THINGS: NOT AT ALL
2. FEELING DOWN, DEPRESSED OR HOPELESS: NOT AT ALL

## 2025-07-26 ASSESSMENT — ACTIVITIES OF DAILY LIVING (ADL)
MANAGING_FINANCES: NEEDS ASSISTANCE
DRESSING: INDEPENDENT
TAKING_MEDICATION: INDEPENDENT
GROCERY_SHOPPING: NEEDS ASSISTANCE
DOING_HOUSEWORK: NEEDS ASSISTANCE
BATHING: INDEPENDENT

## 2025-07-29 LAB
CHOLEST SERPL-MCNC: 137 MG/DL
CHOLEST/HDLC SERPL: 2.1 (CALC)
ERYTHROCYTE [DISTWIDTH] IN BLOOD BY AUTOMATED COUNT: 13.7 % (ref 11–15)
HCT VFR BLD AUTO: 39.3 % (ref 35–45)
HDLC SERPL-MCNC: 64 MG/DL
HGB BLD-MCNC: 12.6 G/DL (ref 11.7–15.5)
LDLC SERPL CALC-MCNC: 49 MG/DL (CALC)
MCH RBC QN AUTO: 28.8 PG (ref 27–33)
MCHC RBC AUTO-ENTMCNC: 32.1 G/DL (ref 32–36)
MCV RBC AUTO: 89.9 FL (ref 80–100)
NONHDLC SERPL-MCNC: 73 MG/DL (CALC)
PLATELET # BLD AUTO: 257 THOUSAND/UL (ref 140–400)
PMV BLD REES-ECKER: 10.8 FL (ref 7.5–12.5)
RBC # BLD AUTO: 4.37 MILLION/UL (ref 3.8–5.1)
TRIGL SERPL-MCNC: 164 MG/DL
TSH SERPL-ACNC: 3.36 MIU/L (ref 0.4–4.5)
WBC # BLD AUTO: 6.4 THOUSAND/UL (ref 3.8–10.8)

## 2025-07-31 ENCOUNTER — APPOINTMENT (OUTPATIENT)
Dept: CARE COORDINATION | Facility: CLINIC | Age: 89
End: 2025-07-31
Payer: MEDICARE

## 2025-08-05 ENCOUNTER — OFFICE VISIT (OUTPATIENT)
Dept: PRIMARY CARE | Facility: CLINIC | Age: 89
End: 2025-08-05
Payer: MEDICARE

## 2025-08-05 ENCOUNTER — PATIENT OUTREACH (OUTPATIENT)
Dept: CARE COORDINATION | Facility: CLINIC | Age: 89
End: 2025-08-05

## 2025-08-05 VITALS — DIASTOLIC BLOOD PRESSURE: 75 MMHG | SYSTOLIC BLOOD PRESSURE: 131 MMHG

## 2025-08-05 DIAGNOSIS — E11.9 TYPE 2 DIABETES MELLITUS WITHOUT COMPLICATION, UNSPECIFIED WHETHER LONG TERM INSULIN USE: ICD-10-CM

## 2025-08-05 DIAGNOSIS — I10 PRIMARY HYPERTENSION: ICD-10-CM

## 2025-08-05 DIAGNOSIS — K64.4 EXTERNAL HEMORRHOIDS WITH COMPLICATION: Primary | ICD-10-CM

## 2025-08-05 PROCEDURE — 3075F SYST BP GE 130 - 139MM HG: CPT | Performed by: INTERNAL MEDICINE

## 2025-08-05 PROCEDURE — 99213 OFFICE O/P EST LOW 20 MIN: CPT | Performed by: INTERNAL MEDICINE

## 2025-08-05 PROCEDURE — 3078F DIAST BP <80 MM HG: CPT | Performed by: INTERNAL MEDICINE

## 2025-08-05 PROCEDURE — G2211 COMPLEX E/M VISIT ADD ON: HCPCS | Performed by: INTERNAL MEDICINE

## 2025-08-05 NOTE — PROGRESS NOTES
Subjective   Patient ID: Lisa Thakkar is a 89 y.o. female who presents for No chief complaint on file..    HPI follow-up visit and sick visit no chest pain no shortness of breath no polyuria polydipsia no side effect with medication discussed with blood sugars however she is had some bleeding from she presumes hemorrhoids which she has had in the past makes her anxious although she is not having pain and there is no dark or stool the blood appears to be red and separate from the stool    Review of Systems    Objective   /75     Physical Exam vital signs noted alert and oriented x 3 NCAT no JVD or bruit no pallor no icterus no jaundice abdomen soft nontender normal active bowel sounds just clear to auscultation cor regular rate rhythm S1-S2 rectum large hemorrhoidal complex external no fresh blood but some dried blood and blood on the undergarments extremities no clubbing cyanosis or edema normal distal pulses    Assessment/Plan impression other diagnoses (diabetes blood pressure) external hemorrhoids with complication  Plan okay to follow-up with the colorectal surgery clinic requisition made and advised on same with good fiber in the diet stool softener Preparation H sitz bath's if able in the meantime continue with other medications watching diet good water consumption and recheck after above and for regular visit  {Assess/PlanSmartLinks:[id number]}

## 2025-08-05 NOTE — PROGRESS NOTES
Called Lisa to see about rescheduling missed visit last week as she had called back and LVM stating she did not hear the phone. LVM for her today to call back and when we connect we can set up a new appointment.

## 2025-08-06 ENCOUNTER — PATIENT OUTREACH (OUTPATIENT)
Dept: CARE COORDINATION | Facility: CLINIC | Age: 89
End: 2025-08-06
Payer: MEDICARE

## 2025-08-06 NOTE — PROGRESS NOTES
"Nutrition Follow-up   Dietitian 1 mo  follow-up. Pt is being seen Virtual, as phone only for diabetes management    Labs  Lab Results   Component Value Date    HGBA1C 9.5 (H) 2025    HGBA1C 10.0 (H) 2025    HGBA1C 9.9 (H) 10/10/2024     2025    K 4.4 2025     2025    CO2 26 2025    BUN 23 2025    CREATININE 1.20 (H) 2025    CALCIUM 10.0 2025    ALBUMIN 4.4 2025    PROT 7.0 2025    BILITOT 0.6 2025    ALKPHOS 130 2025    ALT 24 2025    AST 27 2025    GLUCOSE 174 (H) 2025       Lab Results   Component Value Date    CHOL 137 2025    LDLCALC 49 2025    TRIG 164 (H) 2025    HDL 64 2025    LDLF 38 09/15/2023          Comments:     CMP:    Lipid panel:    Vitamin D:    A1C:  down slightly to 9.5% not at goal of <8-8.5%    Anthropometrics  Ht Readings from Last 1 Encounters:   25 1.549 m (5' 1\")       BMI Readings from Last 1 Encounters:   25 28.91 kg/m²       Wt Readings from Last 10 Encounters:   25 69.4 kg (153 lb)   25 69.4 kg (153 lb)   25 69.4 kg (153 lb 1.6 oz)   25 70.8 kg (156 lb)   25 68.9 kg (152 lb)   12/10/24 70.3 kg (155 lb)   24 72.1 kg (158 lb 14.4 oz)   24 71.7 kg (158 lb)   24 71.4 kg (157 lb 4.8 oz)   24 69.9 kg (154 lb 3.2 oz)       Blood Glucose Meter Readings: she checks am fastin, 194, 159, 152, 157, 186, 175, 171, 188, 191, 175, 186, 177, 177, 180, 140, 157, 164, 152, 152, 158, 181, 182, 164, 154    Visit Summary  Called Lisa and we were able to connect and decided to do our follow up at this time vs reschedule missed visit. She shared she saw her doctor and has hemorrhoids and will see a specialist this week about that. iLsa notes she is too afraid to take the new Metformin XR as she read the pamphlet from the pharmacy when she picked it up and is concerned about side effects. We talked about how it " is just a slower release version on Metformin and may reduce the urgent diarrhea she was upset about last call, but due to her concerns, advised her to discuss with Lotus her  pharmacist in their upcoming call. She is going to just continue the regular Metformin for now as she has some left. She says the diarrhea was a little better right now. For now she will continue current plan and this educator will message Lotus about Metformin concerns.    Nutrition Diagnosis:  Diagnosis Statement 1:  Diagnosis Status: New  Diagnosis : Altered nutrition related lab values  related to limited adjustment for lifestyle changes and decreased metabolism (eg, aging)  as evidenced by conditions associated with a chronic condition (DM, HTN, CAD, ED, DE, GI, etc.) and elevated plasma glucose and/or HgbA1c levels     Nutrition Goals    Speak with Lotus regarding Metformin vs Metformin ER  2.   Continue current medication you are comfortable with (regular Metformin and Glimepiride once a day)      Follow up Plan  Will follow-up x 6 wks

## 2025-08-08 ENCOUNTER — OFFICE VISIT (OUTPATIENT)
Dept: SURGERY | Facility: CLINIC | Age: 89
End: 2025-08-08
Payer: MEDICARE

## 2025-08-08 VITALS — DIASTOLIC BLOOD PRESSURE: 78 MMHG | HEART RATE: 64 BPM | SYSTOLIC BLOOD PRESSURE: 157 MMHG

## 2025-08-08 DIAGNOSIS — K64.8 INFLAMED INTERNAL HEMORRHOID: Primary | ICD-10-CM

## 2025-08-08 DIAGNOSIS — R15.9 INCONTINENCE OF FECES, UNSPECIFIED FECAL INCONTINENCE TYPE: ICD-10-CM

## 2025-08-08 PROCEDURE — 46600 DIAGNOSTIC ANOSCOPY SPX: CPT | Performed by: NURSE PRACTITIONER

## 2025-08-08 PROCEDURE — 1159F MED LIST DOCD IN RCRD: CPT | Performed by: NURSE PRACTITIONER

## 2025-08-08 PROCEDURE — 99213 OFFICE O/P EST LOW 20 MIN: CPT | Mod: 25 | Performed by: NURSE PRACTITIONER

## 2025-08-08 PROCEDURE — 99203 OFFICE O/P NEW LOW 30 MIN: CPT | Performed by: NURSE PRACTITIONER

## 2025-08-08 ASSESSMENT — ENCOUNTER SYMPTOMS: ANAL BLEEDING: 1

## 2025-08-08 NOTE — PROGRESS NOTES
History Of Present Illness  Lisa Thakkar is a 89 y.o. female presenting with ***.   She has external hemorrhoids that are bleeding.  No c/o any anal pain.      She was having multiple BM's every day and the stool were loose, but she is suppose to start a new medication so that she will have less BM's.  .  She has accidents or stool and urine and has had that for years.  She is not taking any fiber supplements or Imodium.  No hx of any perianal surgeries.  No hx of any vaginal births.      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, initial encounter, Encounter for gynecological examination (general) (routine) without abnormal findings (06/06/2022), Other conditions influencing health status (05/21/2014), Other conditions influencing health status (08/28/2014), Personal history of diseases of the blood and blood-forming organs and certain disorders involving the immune mechanism (04/20/2016), Personal history of diseases of the blood and blood-forming organs and certain disorders involving the immune mechanism, Personal history of diseases of the skin and subcutaneous tissue, Personal history of malignant neoplasm, unspecified, Personal history of other endocrine, nutritional and metabolic disease, Personal history of other endocrine, nutritional and metabolic disease, Personal history of urinary calculi, Pruritus, unspecified (09/25/2015), Unspecified cataract, Unspecified cataract, and Vitreous degeneration, left eye.    Surgical History  She has a past surgical history that includes Breast surgery (01/19/2017); Cardiac surgery (01/19/2017); Hand surgery (01/19/2017); Cataract extraction (07/20/2017); Coronary artery bypass graft (02/10/2017); Hysterectomy (10/30/2014); and Tonsillectomy (10/30/2014).     Social History  She reports that she has never smoked. She has never been exposed to tobacco smoke. She has never used  smokeless tobacco. She reports that she does not drink alcohol and does not use drugs.    Family History  Family History[1]     Allergies  Pioglitazone, Amoxicillin, Axid [nizatidine], Bactrim [sulfamethoxazole-trimethoprim], Celebrex [celecoxib], Ciprofloxacin, Clindamycin, Cortisone, Erythromycin, Hydrocortisone, Lansoprazole, Methocarbamol, Methylprednisolone, Niacin, Prevacid naprapac [lansoprazole-naproxen], Protonix [pantoprazole], Sulfa (sulfonamide antibiotics), and Welchol [colesevelam]    Review of Systems   Gastrointestinal:  Positive for anal bleeding.        Physical Exam  Exam conducted with a chaperone present.   Constitutional:       Appearance: Normal appearance.   HENT:      Head: Normocephalic and atraumatic.   Pulmonary:      Effort: Pulmonary effort is normal.     Musculoskeletal:         General: Normal range of motion.     Skin:     General: Skin is warm and dry.     Neurological:      General: No focal deficit present.      Mental Status: She is alert and oriented to person, place, and time.     Psychiatric:         Mood and Affect: Mood normal.         Behavior: Behavior normal.       Anoscopy    Date/Time: 8/8/2025 3:47 PM    Performed by: WENCESLAO Garcia  Authorized by: WENCESLAO Garcia    Consent:     Consent obtained:  Verbal    Consent given by:  Patient    Risks, benefits, and alternatives were discussed: yes    Universal protocol:     Procedure explained and questions answered to patient or proxy's satisfaction: yes      Patient identity confirmed:  Verbally with patient  Post-procedure details:     Procedure completion:  Tolerated  Comments:      no external hemorrhoids.  Lax and and a weak tone on HINA.  On anoscopy, looking in 360 degrees, she has mild irritation of the internal hemorrhoids in all quadrants.       Last Recorded Vitals  /78   Pulse 64     Relevant Results  {If you would like to pull in Medications, type .meds     If you would like to pull  in Lab results for the last 24 hours, type .[id number]    If you would like to pull in Imaging results, type .imslt :99}    ***     Assessment/Plan   Lisa has inflamed internal hemorrhoids and fecal incontinence.  She will start taking Metamucil 1-2x/day to help keep her stools soft and bulked.  She will use prep-h 2-3x/day over the next week to help with the inflamed internal hemorrhoids.  She will also increase her fiber and water intake.  She will call with any issues and iwll follow up in 6 weeks if not better       Claire Hart, APRN-CNP       [1]   Family History  Problem Relation Name Age of Onset    Other (cardiac disorder) Mother      Dementia Mother      Ulcers Mother      Osteoporosis Mother      Colon cancer Father      Ulcers Father      Breast cancer Mother's Sister

## 2025-08-14 ENCOUNTER — APPOINTMENT (OUTPATIENT)
Dept: PHARMACY | Facility: HOSPITAL | Age: 89
End: 2025-08-14
Payer: MEDICARE

## 2025-08-14 DIAGNOSIS — R80.9 DIABETES MELLITUS DUE TO UNDERLYING CONDITION WITH MICROALBUMINURIA, WITHOUT LONG-TERM CURRENT USE OF INSULIN (MULTI): ICD-10-CM

## 2025-08-14 DIAGNOSIS — E11.9 TYPE 2 DIABETES MELLITUS WITHOUT COMPLICATION, UNSPECIFIED WHETHER LONG TERM INSULIN USE: ICD-10-CM

## 2025-08-14 DIAGNOSIS — E08.29 DIABETES MELLITUS DUE TO UNDERLYING CONDITION WITH MICROALBUMINURIA, WITHOUT LONG-TERM CURRENT USE OF INSULIN (MULTI): ICD-10-CM

## 2025-08-14 RX ORDER — METFORMIN HYDROCHLORIDE 500 MG/1
500 TABLET, EXTENDED RELEASE ORAL
Qty: 60 TABLET | Refills: 2 | Status: SHIPPED | OUTPATIENT
Start: 2025-08-14 | End: 2025-11-12

## 2025-08-26 ENCOUNTER — PATIENT OUTREACH (OUTPATIENT)
Dept: PRIMARY CARE | Facility: CLINIC | Age: 89
End: 2025-08-26
Payer: MEDICARE

## 2025-08-26 DIAGNOSIS — E11.9 TYPE 2 DIABETES MELLITUS WITHOUT COMPLICATION, UNSPECIFIED WHETHER LONG TERM INSULIN USE: ICD-10-CM

## 2025-08-26 DIAGNOSIS — E78.2 MIXED HYPERLIPIDEMIA: ICD-10-CM

## 2025-09-15 ENCOUNTER — APPOINTMENT (OUTPATIENT)
Dept: ENDOCRINOLOGY | Facility: CLINIC | Age: 89
End: 2025-09-15
Payer: MEDICARE

## 2025-09-25 ENCOUNTER — APPOINTMENT (OUTPATIENT)
Dept: CARE COORDINATION | Facility: CLINIC | Age: 89
End: 2025-09-25
Payer: MEDICARE

## 2025-10-02 ENCOUNTER — APPOINTMENT (OUTPATIENT)
Dept: PHARMACY | Facility: HOSPITAL | Age: 89
End: 2025-10-02
Payer: MEDICARE

## 2025-10-28 ENCOUNTER — APPOINTMENT (OUTPATIENT)
Dept: PRIMARY CARE | Facility: CLINIC | Age: 89
End: 2025-10-28
Payer: MEDICARE